# Patient Record
Sex: FEMALE | Race: WHITE | Employment: FULL TIME | URBAN - METROPOLITAN AREA
[De-identification: names, ages, dates, MRNs, and addresses within clinical notes are randomized per-mention and may not be internally consistent; named-entity substitution may affect disease eponyms.]

---

## 2021-06-24 ENCOUNTER — OFFICE VISIT (OUTPATIENT)
Dept: OBGYN CLINIC | Age: 51
End: 2021-06-24
Payer: COMMERCIAL

## 2021-06-24 ENCOUNTER — HOSPITAL ENCOUNTER (OUTPATIENT)
Age: 51
Setting detail: SPECIMEN
Discharge: HOME | End: 2021-06-24
Payer: COMMERCIAL

## 2021-06-24 VITALS
HEIGHT: 71 IN | SYSTOLIC BLOOD PRESSURE: 150 MMHG | WEIGHT: 293 LBS | DIASTOLIC BLOOD PRESSURE: 100 MMHG | BODY MASS INDEX: 41.02 KG/M2

## 2021-06-24 DIAGNOSIS — Z12.31 SCREENING MAMMOGRAM, ENCOUNTER FOR: ICD-10-CM

## 2021-06-24 DIAGNOSIS — Z01.419 WOMEN'S ANNUAL ROUTINE GYNECOLOGICAL EXAMINATION: Primary | ICD-10-CM

## 2021-06-24 PROCEDURE — 99386 PREV VISIT NEW AGE 40-64: CPT | Performed by: OBSTETRICS & GYNECOLOGY

## 2021-06-24 RX ORDER — LOSARTAN POTASSIUM AND HYDROCHLOROTHIAZIDE 12.5; 5 MG/1; MG/1
TABLET ORAL
COMMUNITY
Start: 2021-05-13

## 2021-06-24 NOTE — PROGRESS NOTES
Not on file     Social Determinants of Health     Financial Resource Strain:     Difficulty of Paying Living Expenses:    Food Insecurity:     Worried About Running Out of Food in the Last Year:     [address] in the Last Year:    Transportation Needs:     Lack of Transportation (Medical):  Lack of Transportation (Non-Medical):    Physical Activity:     Days of Exercise per Week:     Minutes of Exercise per Session:    Stress:     Feeling of Stress :    Social Connections:     Frequency of Communication with Friends and Family:     Frequency of Social Gatherings with Friends and Family:     Attends Sikh Services:     Active Member of Clubs or Organizations:     Attends Club or Organization Meetings:     Marital Status:    Intimate Partner Violence:     Fear of Current or Ex-Partner:     Emotionally Abused:     Physically Abused:     Sexually Abused:        MEDICATIONS:  Current Outpatient Medications   Medication Sig Dispense Refill    losartan-hydroCHLOROthiazide (HYZAAR) 50-12.5 MG per tablet        No current facility-administered medications for this visit. ALLERGIES:  Allergies as of 06/24/2021    (No Known Allergies)       Symptoms of decreased mood absent    Immunization status: stated as current, but no records available. Gynecologic History:     Patient's last menstrual period was 06/03/2021. Sexually Active: No    STD History: No     Permanent Sterilization: No   Reversible Birth Control: No        Hormone Replacement Exposure: No      Genetic Qualified Family History of Breast, Ovarian , Colon or Uterine Cancer: No     If YES see scanned worksheet.     Preventative Health Testing:    Health Maintenance:  Health Maintenance Due   Topic Date Due    Potassium monitoring  Never done    Creatinine monitoring  Never done    Hepatitis C screen  Never done    COVID-19 Vaccine (1) Never done    HIV screen  Never done    DTaP/Tdap/Td vaccine (1 - Tdap) Never Constitutional:  Vitals:    06/24/21 0909   BP: (!) 150/100   Site: Left Upper Arm   Position: Sitting   Cuff Size: Large Adult   Weight: (!) 354 lb (160.6 kg)   Height: 5' 11\" (1.803 m)       Chaperone for Intimate Exam   Chaperone was offered and accepted as part of the rooming process.  Chaperone: Willard Madrid          General Appearance: This  is a well Developed, well Nourished, well groomed female. Her BMI was reviewed. Nutritional decision making was discussed. Skin:  There was a Normal Inspection of the skin without rashes or lesions. There were no rashes. Lymphatic:  No Lymph Nodes were Palpable in the neck , axilla or groin.  0 # Of Lymph Nodes     Neck and EENT:  The neck was supple. There were no masses   The thyroid was not enlarged and had no masses. EOMI B/L    Respiratory: The lungs were auscultated and found to be clear. There were no rales, rhonchi or wheezes. There was a good respiratory effort. Cardiovascular: The heart was in a regular rate and rhythm. . No S3 or S4. There was no murmur appreciated. Location, grade, and radiation are not applicable. Extremities: The patients extremities were without calf tenderness, edema, or varicosities. There was full range of motion in all four extremities. Pulses in all four extremities were appreciated and are 2/4. Abdomen: The abdomen was soft and non-tender. There were good bowel sounds in all quadrants and there was no guarding, rebound or rigidity. Abdominal Scars:     Psych: The patient had a normal Orientation to: Time, Place, Person, and Situation  There is no Mood / Affect changes    Breast:  (Chest)  normal appearance, no masses or tenderness  Self breast exams were reviewed in detail. Literature was given. Pelvic Exam:  Vulva and vagina appear normal. Bimanual exam reveals normal uterus and adnexa. Rectal Exam:  exam declined by patient          Musculosk:  Normal Gait and station was noted.   Digits were evaluated without abnormal findings. Range of motion, stability and strength were evaluated and found to be appropriate for the patients age. OMM Structural Component:  The patient did not complain of a Chief complaint requiring OMM. Chief Complaint:none    Structural Exam: No Interest      ASSESSMENT:      46 y.o. Annual   Diagnosis Orders   1. Women's annual routine gynecological examination  PAP Smear   2. Screening mammogram, encounter for            Chief Complaint   Patient presents with    Gynecologic Exam          Past Medical History:   Diagnosis Date    Essential hypertension          There are no problems to display for this patient. Hereditary Breast, Ovarian, Colon and Uterine Cancer screening Done. Tobacco & Secondary smoke risks reviewed; instructed on cessation and avoidance      Counseling Completed:  Preventative Health Recommendations and Follow up. PLAN:  Return in about 1 year (around 6/24/2022), or if symptoms worsen or fail to improve, for annual.  Repeat Annual every 1 year  Cervical Cytology Evaluation begins at 24years old. If Negative Cytology, Follow-up screening per current guidelines. Mammograms every 1 year. If 35 yo and last mammogram was negative. Calcium and Vitamin D dosing reviewed. Colonoscopy screening reviewed as well as onset for bone density testing. STD counseling and prevention reviewed. Routine health maintenance per patients PCP. Orders Placed This Encounter   Procedures    PAP Smear     Patient History:    No LMP recorded. OBGYN Status: unknown  No past surgical history on file. Social History    Tobacco Use      Smoking status: Not on file       Standing Status:   Future     Standing Expiration Date:   6/24/2022     Order Specific Question:   Collection Type     Answer:    Thin Prep     Order Specific Question:   Prior Abnormal Pap Test     Answer:   No     Order Specific Question:   Screening or Diagnostic Answer:   Screening     Order Specific Question:   HPV Requested? Answer:   Yes     Order Specific Question:   High Risk Patient     Answer:   N/A           The patient, Ton Marti is a 46 y.o. female, was seen with a total time spent of 30 minutes for the visit on this date of service by the E/M provider. The time component had both face to face and non face to face time spent in determining the total time component. Counseling and education regarding her diagnosis listed below and her options regarding those diagnoses were also included in determining her time component. Diagnosis Orders   1. Women's annual routine gynecological examination  PAP Smear   2. Screening mammogram, encounter for          The patient had her preventative health maintenance recommendations and follow-up reviewed with her at the completion of her visit.

## 2021-06-29 LAB
HPV SAMPLE: NORMAL
HPV, GENOTYPE 16: NOT DETECTED
HPV, GENOTYPE 18: NOT DETECTED
HPV, HIGH RISK OTHER: NOT DETECTED
HPV, INTERPRETATION: NORMAL
SPECIMEN DESCRIPTION: NORMAL

## 2021-06-30 LAB — CYTOLOGY REPORT: NORMAL

## 2021-07-01 ENCOUNTER — ANESTHESIA (OUTPATIENT)
Dept: OPERATING ROOM | Age: 51
End: 2021-07-01
Payer: COMMERCIAL

## 2021-07-01 ENCOUNTER — HOSPITAL ENCOUNTER (OUTPATIENT)
Age: 51
Setting detail: OBSERVATION
Discharge: HOME | End: 2021-07-02
Attending: EMERGENCY MEDICINE | Admitting: SURGERY
Payer: COMMERCIAL

## 2021-07-01 ENCOUNTER — ANESTHESIA EVENT (OUTPATIENT)
Dept: OPERATING ROOM | Age: 51
End: 2021-07-01
Payer: COMMERCIAL

## 2021-07-01 ENCOUNTER — APPOINTMENT (OUTPATIENT)
Dept: CT IMAGING | Age: 51
End: 2021-07-01
Payer: COMMERCIAL

## 2021-07-01 VITALS
DIASTOLIC BLOOD PRESSURE: 78 MMHG | TEMPERATURE: 99.1 F | SYSTOLIC BLOOD PRESSURE: 156 MMHG | RESPIRATION RATE: 18 BRPM | OXYGEN SATURATION: 92 %

## 2021-07-01 DIAGNOSIS — K35.80 ACUTE APPENDICITIS WITHOUT PERITONITIS: ICD-10-CM

## 2021-07-01 DIAGNOSIS — G89.18 POST-OP PAIN: Primary | ICD-10-CM

## 2021-07-01 LAB
-: ABNORMAL
ABSOLUTE EOS #: 0 K/UL (ref 0–0.4)
ABSOLUTE IMMATURE GRANULOCYTE: ABNORMAL K/UL (ref 0–0.3)
ABSOLUTE LYMPH #: 1 K/UL (ref 1–4.8)
ABSOLUTE MONO #: 0.8 K/UL (ref 0.1–1.2)
ALBUMIN SERPL-MCNC: 4.2 G/DL (ref 3.5–5.2)
ALBUMIN/GLOBULIN RATIO: 1.1 (ref 1–2.5)
ALP BLD-CCNC: 125 U/L (ref 35–104)
ALT SERPL-CCNC: 16 U/L (ref 5–33)
AMORPHOUS: ABNORMAL
ANION GAP SERPL CALCULATED.3IONS-SCNC: 12 MMOL/L (ref 9–17)
AST SERPL-CCNC: 17 U/L
BACTERIA: ABNORMAL
BASOPHILS # BLD: 0 % (ref 0–2)
BASOPHILS ABSOLUTE: 0.1 K/UL (ref 0–0.2)
BILIRUB SERPL-MCNC: 0.96 MG/DL (ref 0.3–1.2)
BILIRUBIN URINE: NEGATIVE
BUN BLDV-MCNC: 12 MG/DL (ref 6–20)
BUN/CREAT BLD: ABNORMAL (ref 9–20)
CALCIUM SERPL-MCNC: 9.5 MG/DL (ref 8.6–10.4)
CASTS UA: ABNORMAL /LPF
CHLORIDE BLD-SCNC: 94 MMOL/L (ref 98–107)
CO2: 23 MMOL/L (ref 20–31)
COLOR: ABNORMAL
COMMENT UA: ABNORMAL
CREAT SERPL-MCNC: 0.85 MG/DL (ref 0.5–0.9)
CRYSTALS, UA: ABNORMAL /HPF
DIFFERENTIAL TYPE: ABNORMAL
EOSINOPHILS RELATIVE PERCENT: 0 % (ref 1–4)
EPITHELIAL CELLS UA: ABNORMAL /HPF (ref 0–5)
GFR AFRICAN AMERICAN: >60 ML/MIN
GFR NON-AFRICAN AMERICAN: >60 ML/MIN
GFR SERPL CREATININE-BSD FRML MDRD: ABNORMAL ML/MIN/{1.73_M2}
GFR SERPL CREATININE-BSD FRML MDRD: ABNORMAL ML/MIN/{1.73_M2}
GLUCOSE BLD-MCNC: 135 MG/DL (ref 70–99)
GLUCOSE URINE: NEGATIVE
HCG QUALITATIVE: NEGATIVE
HCT VFR BLD CALC: 37.4 % (ref 36–46)
HEMOGLOBIN: 12.2 G/DL (ref 12–16)
IMMATURE GRANULOCYTES: ABNORMAL %
KETONES, URINE: ABNORMAL
LEUKOCYTE ESTERASE, URINE: NEGATIVE
LYMPHOCYTES # BLD: 5 % (ref 24–44)
MCH RBC QN AUTO: 27.9 PG (ref 26–34)
MCHC RBC AUTO-ENTMCNC: 32.5 G/DL (ref 31–37)
MCV RBC AUTO: 85.8 FL (ref 80–100)
MONOCYTES # BLD: 4 % (ref 2–11)
MUCUS: ABNORMAL
NITRITE, URINE: NEGATIVE
NRBC AUTOMATED: ABNORMAL PER 100 WBC
OTHER OBSERVATIONS UA: ABNORMAL
PDW BLD-RTO: 14.2 % (ref 12.5–15.4)
PH UA: 5.5 (ref 5–8)
PLATELET # BLD: 380 K/UL (ref 140–450)
PLATELET ESTIMATE: ABNORMAL
PMV BLD AUTO: 7.8 FL (ref 6–12)
POTASSIUM SERPL-SCNC: 3.7 MMOL/L (ref 3.7–5.3)
PROTEIN UA: ABNORMAL
RBC # BLD: 4.37 M/UL (ref 4–5.2)
RBC # BLD: ABNORMAL 10*6/UL
RBC UA: ABNORMAL /HPF (ref 0–2)
RENAL EPITHELIAL, UA: ABNORMAL /HPF
SEG NEUTROPHILS: 91 % (ref 36–66)
SEGMENTED NEUTROPHILS ABSOLUTE COUNT: 17.9 K/UL (ref 1.8–7.7)
SODIUM BLD-SCNC: 129 MMOL/L (ref 135–144)
SPECIFIC GRAVITY UA: 1.01 (ref 1–1.03)
TOTAL PROTEIN: 8 G/DL (ref 6.4–8.3)
TRICHOMONAS: ABNORMAL
TURBIDITY: ABNORMAL
URINE HGB: ABNORMAL
UROBILINOGEN, URINE: NORMAL
WBC # BLD: 19.8 K/UL (ref 3.5–11)
WBC # BLD: ABNORMAL 10*3/UL
WBC UA: ABNORMAL /HPF (ref 0–5)
YEAST: ABNORMAL

## 2021-07-01 PROCEDURE — 3600000009 HC SURGERY ROBOT BASE: Performed by: SURGERY

## 2021-07-01 PROCEDURE — 81001 URINALYSIS AUTO W/SCOPE: CPT

## 2021-07-01 PROCEDURE — 36415 COLL VENOUS BLD VENIPUNCTURE: CPT

## 2021-07-01 PROCEDURE — 3700000001 HC ADD 15 MINUTES (ANESTHESIA): Performed by: SURGERY

## 2021-07-01 PROCEDURE — 85025 COMPLETE CBC W/AUTO DIFF WBC: CPT

## 2021-07-01 PROCEDURE — 6370000000 HC RX 637 (ALT 250 FOR IP): Performed by: STUDENT IN AN ORGANIZED HEALTH CARE EDUCATION/TRAINING PROGRAM

## 2021-07-01 PROCEDURE — 2580000003 HC RX 258: Performed by: EMERGENCY MEDICINE

## 2021-07-01 PROCEDURE — 88304 TISSUE EXAM BY PATHOLOGIST: CPT

## 2021-07-01 PROCEDURE — 99285 EMERGENCY DEPT VISIT HI MDM: CPT

## 2021-07-01 PROCEDURE — 84703 CHORIONIC GONADOTROPIN ASSAY: CPT

## 2021-07-01 PROCEDURE — 2709999900 HC NON-CHARGEABLE SUPPLY: Performed by: SURGERY

## 2021-07-01 PROCEDURE — 6360000002 HC RX W HCPCS: Performed by: EMERGENCY MEDICINE

## 2021-07-01 PROCEDURE — 96372 THER/PROPH/DIAG INJ SC/IM: CPT

## 2021-07-01 PROCEDURE — 2500000003 HC RX 250 WO HCPCS: Performed by: SURGERY

## 2021-07-01 PROCEDURE — 6360000002 HC RX W HCPCS: Performed by: STUDENT IN AN ORGANIZED HEALTH CARE EDUCATION/TRAINING PROGRAM

## 2021-07-01 PROCEDURE — 2580000003 HC RX 258: Performed by: ANESTHESIOLOGY

## 2021-07-01 PROCEDURE — 3700000000 HC ANESTHESIA ATTENDED CARE: Performed by: SURGERY

## 2021-07-01 PROCEDURE — 2580000003 HC RX 258: Performed by: STUDENT IN AN ORGANIZED HEALTH CARE EDUCATION/TRAINING PROGRAM

## 2021-07-01 PROCEDURE — 3600000019 HC SURGERY ROBOT ADDTL 15MIN: Performed by: SURGERY

## 2021-07-01 PROCEDURE — 2500000003 HC RX 250 WO HCPCS: Performed by: ANESTHESIOLOGY

## 2021-07-01 PROCEDURE — 80053 COMPREHEN METABOLIC PANEL: CPT

## 2021-07-01 PROCEDURE — 96365 THER/PROPH/DIAG IV INF INIT: CPT

## 2021-07-01 PROCEDURE — 96366 THER/PROPH/DIAG IV INF ADDON: CPT

## 2021-07-01 PROCEDURE — 6360000004 HC RX CONTRAST MEDICATION: Performed by: EMERGENCY MEDICINE

## 2021-07-01 PROCEDURE — G0378 HOSPITAL OBSERVATION PER HR: HCPCS

## 2021-07-01 PROCEDURE — 74177 CT ABD & PELVIS W/CONTRAST: CPT

## 2021-07-01 PROCEDURE — 7100000001 HC PACU RECOVERY - ADDTL 15 MIN: Performed by: SURGERY

## 2021-07-01 PROCEDURE — S2900 ROBOTIC SURGICAL SYSTEM: HCPCS | Performed by: SURGERY

## 2021-07-01 PROCEDURE — C1760 CLOSURE DEV, VASC: HCPCS | Performed by: SURGERY

## 2021-07-01 PROCEDURE — 2720000010 HC SURG SUPPLY STERILE: Performed by: SURGERY

## 2021-07-01 PROCEDURE — 6360000002 HC RX W HCPCS: Performed by: ANESTHESIOLOGY

## 2021-07-01 PROCEDURE — 7100000000 HC PACU RECOVERY - FIRST 15 MIN: Performed by: SURGERY

## 2021-07-01 PROCEDURE — 96375 TX/PRO/DX INJ NEW DRUG ADDON: CPT

## 2021-07-01 RX ORDER — SODIUM CHLORIDE 0.9 % (FLUSH) 0.9 %
5-40 SYRINGE (ML) INJECTION EVERY 12 HOURS SCHEDULED
Status: DISCONTINUED | OUTPATIENT
Start: 2021-07-01 | End: 2021-07-02 | Stop reason: HOSPADM

## 2021-07-01 RX ORDER — HYDROCHLOROTHIAZIDE 25 MG/1
12.5 TABLET ORAL DAILY
Status: DISCONTINUED | OUTPATIENT
Start: 2021-07-01 | End: 2021-07-02 | Stop reason: HOSPADM

## 2021-07-01 RX ORDER — ONDANSETRON 2 MG/ML
4 INJECTION INTRAMUSCULAR; INTRAVENOUS EVERY 6 HOURS PRN
Status: DISCONTINUED | OUTPATIENT
Start: 2021-07-01 | End: 2021-07-02 | Stop reason: HOSPADM

## 2021-07-01 RX ORDER — PROMETHAZINE HYDROCHLORIDE 25 MG/ML
6.25 INJECTION, SOLUTION INTRAMUSCULAR; INTRAVENOUS
Status: DISCONTINUED | OUTPATIENT
Start: 2021-07-01 | End: 2021-07-01 | Stop reason: HOSPADM

## 2021-07-01 RX ORDER — OXYCODONE HYDROCHLORIDE AND ACETAMINOPHEN 5; 325 MG/1; MG/1
1 TABLET ORAL EVERY 6 HOURS PRN
Qty: 15 TABLET | Refills: 0 | Status: SHIPPED | OUTPATIENT
Start: 2021-07-01 | End: 2021-07-04

## 2021-07-01 RX ORDER — PROPOFOL 10 MG/ML
INJECTION, EMULSION INTRAVENOUS PRN
Status: DISCONTINUED | OUTPATIENT
Start: 2021-07-01 | End: 2021-07-01 | Stop reason: SDUPTHER

## 2021-07-01 RX ORDER — FENTANYL CITRATE 50 UG/ML
INJECTION, SOLUTION INTRAMUSCULAR; INTRAVENOUS PRN
Status: DISCONTINUED | OUTPATIENT
Start: 2021-07-01 | End: 2021-07-01 | Stop reason: SDUPTHER

## 2021-07-01 RX ORDER — MORPHINE SULFATE 2 MG/ML
1 INJECTION, SOLUTION INTRAMUSCULAR; INTRAVENOUS EVERY 5 MIN PRN
Status: DISCONTINUED | OUTPATIENT
Start: 2021-07-01 | End: 2021-07-01 | Stop reason: HOSPADM

## 2021-07-01 RX ORDER — ONDANSETRON 2 MG/ML
INJECTION INTRAMUSCULAR; INTRAVENOUS PRN
Status: DISCONTINUED | OUTPATIENT
Start: 2021-07-01 | End: 2021-07-01 | Stop reason: SDUPTHER

## 2021-07-01 RX ORDER — FENTANYL CITRATE 50 UG/ML
25 INJECTION, SOLUTION INTRAMUSCULAR; INTRAVENOUS EVERY 5 MIN PRN
Status: DISCONTINUED | OUTPATIENT
Start: 2021-07-01 | End: 2021-07-01 | Stop reason: HOSPADM

## 2021-07-01 RX ORDER — CYCLOBENZAPRINE HCL 10 MG
10 TABLET ORAL 2 TIMES DAILY
Status: DISCONTINUED | OUTPATIENT
Start: 2021-07-01 | End: 2021-07-02 | Stop reason: HOSPADM

## 2021-07-01 RX ORDER — ONDANSETRON 2 MG/ML
4 INJECTION INTRAMUSCULAR; INTRAVENOUS
Status: DISCONTINUED | OUTPATIENT
Start: 2021-07-01 | End: 2021-07-01 | Stop reason: HOSPADM

## 2021-07-01 RX ORDER — MIDAZOLAM HYDROCHLORIDE 1 MG/ML
INJECTION INTRAMUSCULAR; INTRAVENOUS PRN
Status: DISCONTINUED | OUTPATIENT
Start: 2021-07-01 | End: 2021-07-01 | Stop reason: SDUPTHER

## 2021-07-01 RX ORDER — MEPERIDINE HYDROCHLORIDE 50 MG/ML
12.5 INJECTION INTRAMUSCULAR; INTRAVENOUS; SUBCUTANEOUS EVERY 5 MIN PRN
Status: DISCONTINUED | OUTPATIENT
Start: 2021-07-01 | End: 2021-07-01 | Stop reason: HOSPADM

## 2021-07-01 RX ORDER — BUPIVACAINE HYDROCHLORIDE 2.5 MG/ML
INJECTION, SOLUTION INFILTRATION; PERINEURAL PRN
Status: DISCONTINUED | OUTPATIENT
Start: 2021-07-01 | End: 2021-07-01 | Stop reason: HOSPADM

## 2021-07-01 RX ORDER — 0.9 % SODIUM CHLORIDE 0.9 %
1000 INTRAVENOUS SOLUTION INTRAVENOUS ONCE
Status: COMPLETED | OUTPATIENT
Start: 2021-07-01 | End: 2021-07-01

## 2021-07-01 RX ORDER — KETOROLAC TROMETHAMINE 30 MG/ML
INJECTION, SOLUTION INTRAMUSCULAR; INTRAVENOUS PRN
Status: DISCONTINUED | OUTPATIENT
Start: 2021-07-01 | End: 2021-07-01 | Stop reason: SDUPTHER

## 2021-07-01 RX ORDER — SODIUM CHLORIDE 9 MG/ML
25 INJECTION, SOLUTION INTRAVENOUS PRN
Status: DISCONTINUED | OUTPATIENT
Start: 2021-07-01 | End: 2021-07-02 | Stop reason: HOSPADM

## 2021-07-01 RX ORDER — LOSARTAN POTASSIUM AND HYDROCHLOROTHIAZIDE 12.5; 5 MG/1; MG/1
1 TABLET ORAL DAILY
Status: DISCONTINUED | OUTPATIENT
Start: 2021-07-01 | End: 2021-07-01 | Stop reason: RX

## 2021-07-01 RX ORDER — BUPIVACAINE HYDROCHLORIDE 2.5 MG/ML
INJECTION, SOLUTION EPIDURAL; INFILTRATION; INTRACAUDAL
Status: DISCONTINUED
Start: 2021-07-01 | End: 2021-07-01

## 2021-07-01 RX ORDER — MORPHINE SULFATE 4 MG/ML
4 INJECTION, SOLUTION INTRAMUSCULAR; INTRAVENOUS
Status: DISCONTINUED | OUTPATIENT
Start: 2021-07-01 | End: 2021-07-02 | Stop reason: HOSPADM

## 2021-07-01 RX ORDER — DOCUSATE SODIUM 100 MG/1
100 CAPSULE, LIQUID FILLED ORAL 2 TIMES DAILY
Qty: 20 CAPSULE | Refills: 0 | Status: SHIPPED | OUTPATIENT
Start: 2021-07-01 | End: 2021-10-01

## 2021-07-01 RX ORDER — SODIUM CHLORIDE 0.9 % (FLUSH) 0.9 %
5-40 SYRINGE (ML) INJECTION PRN
Status: DISCONTINUED | OUTPATIENT
Start: 2021-07-01 | End: 2021-07-02 | Stop reason: HOSPADM

## 2021-07-01 RX ORDER — CYCLOBENZAPRINE HCL 10 MG
10 TABLET ORAL 2 TIMES DAILY PRN
Qty: 20 TABLET | Refills: 0 | Status: SHIPPED | OUTPATIENT
Start: 2021-07-01 | End: 2021-07-11

## 2021-07-01 RX ORDER — DEXAMETHASONE SODIUM PHOSPHATE 10 MG/ML
INJECTION, SOLUTION INTRAMUSCULAR; INTRAVENOUS PRN
Status: DISCONTINUED | OUTPATIENT
Start: 2021-07-01 | End: 2021-07-01 | Stop reason: SDUPTHER

## 2021-07-01 RX ORDER — BUPIVACAINE HYDROCHLORIDE AND EPINEPHRINE 5; 5 MG/ML; UG/ML
INJECTION, SOLUTION PERINEURAL
Status: DISCONTINUED
Start: 2021-07-01 | End: 2021-07-01

## 2021-07-01 RX ORDER — HYDROCODONE BITARTRATE AND ACETAMINOPHEN 5; 325 MG/1; MG/1
2 TABLET ORAL PRN
Status: DISCONTINUED | OUTPATIENT
Start: 2021-07-01 | End: 2021-07-01 | Stop reason: HOSPADM

## 2021-07-01 RX ORDER — LOSARTAN POTASSIUM 50 MG/1
50 TABLET ORAL DAILY
Status: DISCONTINUED | OUTPATIENT
Start: 2021-07-01 | End: 2021-07-02 | Stop reason: HOSPADM

## 2021-07-01 RX ORDER — SODIUM CHLORIDE, SODIUM LACTATE, POTASSIUM CHLORIDE, CALCIUM CHLORIDE 600; 310; 30; 20 MG/100ML; MG/100ML; MG/100ML; MG/100ML
INJECTION, SOLUTION INTRAVENOUS CONTINUOUS PRN
Status: DISCONTINUED | OUTPATIENT
Start: 2021-07-01 | End: 2021-07-01 | Stop reason: SDUPTHER

## 2021-07-01 RX ORDER — 0.9 % SODIUM CHLORIDE 0.9 %
80 INTRAVENOUS SOLUTION INTRAVENOUS ONCE
Status: COMPLETED | OUTPATIENT
Start: 2021-07-01 | End: 2021-07-01

## 2021-07-01 RX ORDER — LIDOCAINE HYDROCHLORIDE 10 MG/ML
INJECTION, SOLUTION EPIDURAL; INFILTRATION; INTRACAUDAL; PERINEURAL PRN
Status: DISCONTINUED | OUTPATIENT
Start: 2021-07-01 | End: 2021-07-01 | Stop reason: SDUPTHER

## 2021-07-01 RX ORDER — AMOXICILLIN AND CLAVULANATE POTASSIUM 875; 125 MG/1; MG/1
1 TABLET, FILM COATED ORAL 2 TIMES DAILY
Qty: 14 TABLET | Refills: 0 | Status: SHIPPED | OUTPATIENT
Start: 2021-07-01 | End: 2021-07-08

## 2021-07-01 RX ORDER — ONDANSETRON 4 MG/1
4 TABLET, ORALLY DISINTEGRATING ORAL EVERY 8 HOURS PRN
Status: DISCONTINUED | OUTPATIENT
Start: 2021-07-01 | End: 2021-07-02 | Stop reason: HOSPADM

## 2021-07-01 RX ORDER — OXYCODONE HYDROCHLORIDE AND ACETAMINOPHEN 5; 325 MG/1; MG/1
1 TABLET ORAL EVERY 6 HOURS PRN
Status: DISCONTINUED | OUTPATIENT
Start: 2021-07-01 | End: 2021-07-02 | Stop reason: HOSPADM

## 2021-07-01 RX ORDER — MORPHINE SULFATE 4 MG/ML
4 INJECTION, SOLUTION INTRAMUSCULAR; INTRAVENOUS ONCE
Status: COMPLETED | OUTPATIENT
Start: 2021-07-01 | End: 2021-07-01

## 2021-07-01 RX ORDER — SUCCINYLCHOLINE/SOD CL,ISO/PF 100 MG/5ML
SYRINGE (ML) INTRAVENOUS PRN
Status: DISCONTINUED | OUTPATIENT
Start: 2021-07-01 | End: 2021-07-01 | Stop reason: SDUPTHER

## 2021-07-01 RX ORDER — HYDRALAZINE HYDROCHLORIDE 20 MG/ML
5 INJECTION INTRAMUSCULAR; INTRAVENOUS EVERY 10 MIN PRN
Status: DISCONTINUED | OUTPATIENT
Start: 2021-07-01 | End: 2021-07-01 | Stop reason: HOSPADM

## 2021-07-01 RX ORDER — ROCURONIUM BROMIDE 10 MG/ML
INJECTION, SOLUTION INTRAVENOUS PRN
Status: DISCONTINUED | OUTPATIENT
Start: 2021-07-01 | End: 2021-07-01 | Stop reason: SDUPTHER

## 2021-07-01 RX ORDER — SODIUM CHLORIDE 0.9 % (FLUSH) 0.9 %
10 SYRINGE (ML) INJECTION PRN
Status: DISCONTINUED | OUTPATIENT
Start: 2021-07-01 | End: 2021-07-02 | Stop reason: HOSPADM

## 2021-07-01 RX ORDER — BUPIVACAINE HYDROCHLORIDE AND EPINEPHRINE 5; 5 MG/ML; UG/ML
INJECTION, SOLUTION EPIDURAL; INTRACAUDAL; PERINEURAL PRN
Status: DISCONTINUED | OUTPATIENT
Start: 2021-07-01 | End: 2021-07-01 | Stop reason: HOSPADM

## 2021-07-01 RX ORDER — MORPHINE SULFATE 2 MG/ML
2 INJECTION, SOLUTION INTRAMUSCULAR; INTRAVENOUS
Status: DISCONTINUED | OUTPATIENT
Start: 2021-07-01 | End: 2021-07-02 | Stop reason: HOSPADM

## 2021-07-01 RX ORDER — HYDROCODONE BITARTRATE AND ACETAMINOPHEN 5; 325 MG/1; MG/1
1 TABLET ORAL PRN
Status: DISCONTINUED | OUTPATIENT
Start: 2021-07-01 | End: 2021-07-01 | Stop reason: HOSPADM

## 2021-07-01 RX ORDER — DIPHENHYDRAMINE HYDROCHLORIDE 50 MG/ML
12.5 INJECTION INTRAMUSCULAR; INTRAVENOUS
Status: DISCONTINUED | OUTPATIENT
Start: 2021-07-01 | End: 2021-07-01 | Stop reason: HOSPADM

## 2021-07-01 RX ADMIN — IOPAMIDOL 75 ML: 755 INJECTION, SOLUTION INTRAVENOUS at 10:41

## 2021-07-01 RX ADMIN — ROCURONIUM BROMIDE 40 MG: 10 INJECTION INTRAVENOUS at 13:25

## 2021-07-01 RX ADMIN — MORPHINE SULFATE 4 MG: 4 INJECTION INTRAVENOUS at 10:15

## 2021-07-01 RX ADMIN — PROPOFOL INJECTABLE EMULSION 30 MG: 10 INJECTION, EMULSION INTRAVENOUS at 14:43

## 2021-07-01 RX ADMIN — DEXAMETHASONE SODIUM PHOSPHATE 10 MG: 10 INJECTION, SOLUTION INTRAMUSCULAR; INTRAVENOUS at 13:35

## 2021-07-01 RX ADMIN — PROPOFOL INJECTABLE EMULSION 20 MG: 10 INJECTION, EMULSION INTRAVENOUS at 13:54

## 2021-07-01 RX ADMIN — PROPOFOL INJECTABLE EMULSION 30 MG: 10 INJECTION, EMULSION INTRAVENOUS at 14:58

## 2021-07-01 RX ADMIN — FENTANYL CITRATE 50 MCG: 50 INJECTION, SOLUTION INTRAMUSCULAR; INTRAVENOUS at 13:24

## 2021-07-01 RX ADMIN — SUGAMMADEX 150 MG: 100 INJECTION, SOLUTION INTRAVENOUS at 14:55

## 2021-07-01 RX ADMIN — PROPOFOL INJECTABLE EMULSION 200 MG: 10 INJECTION, EMULSION INTRAVENOUS at 13:20

## 2021-07-01 RX ADMIN — SODIUM CHLORIDE 1000 ML: 9 INJECTION, SOLUTION INTRAVENOUS at 10:15

## 2021-07-01 RX ADMIN — SODIUM CHLORIDE, PRESERVATIVE FREE 10 ML: 5 INJECTION INTRAVENOUS at 10:41

## 2021-07-01 RX ADMIN — FENTANYL CITRATE 50 MCG: 50 INJECTION, SOLUTION INTRAMUSCULAR; INTRAVENOUS at 13:50

## 2021-07-01 RX ADMIN — LOSARTAN POTASSIUM 50 MG: 50 TABLET, FILM COATED ORAL at 17:26

## 2021-07-01 RX ADMIN — PROPOFOL INJECTABLE EMULSION 20 MG: 10 INJECTION, EMULSION INTRAVENOUS at 14:49

## 2021-07-01 RX ADMIN — FENTANYL CITRATE 50 MCG: 50 INJECTION, SOLUTION INTRAMUSCULAR; INTRAVENOUS at 13:20

## 2021-07-01 RX ADMIN — PIPERACILLIN AND TAZOBACTAM 3375 MG: 3; .375 INJECTION, POWDER, LYOPHILIZED, FOR SOLUTION INTRAVENOUS at 22:50

## 2021-07-01 RX ADMIN — Medication 120 MG: at 13:20

## 2021-07-01 RX ADMIN — SODIUM CHLORIDE, PRESERVATIVE FREE 10 ML: 5 INJECTION INTRAVENOUS at 17:27

## 2021-07-01 RX ADMIN — MIDAZOLAM HYDROCHLORIDE 2 MG: 1 INJECTION, SOLUTION INTRAMUSCULAR; INTRAVENOUS at 13:17

## 2021-07-01 RX ADMIN — SODIUM CHLORIDE, POTASSIUM CHLORIDE, SODIUM LACTATE AND CALCIUM CHLORIDE: 600; 310; 30; 20 INJECTION, SOLUTION INTRAVENOUS at 14:00

## 2021-07-01 RX ADMIN — PROPOFOL INJECTABLE EMULSION 10 MG: 10 INJECTION, EMULSION INTRAVENOUS at 14:55

## 2021-07-01 RX ADMIN — SODIUM CHLORIDE, POTASSIUM CHLORIDE, SODIUM LACTATE AND CALCIUM CHLORIDE: 600; 310; 30; 20 INJECTION, SOLUTION INTRAVENOUS at 14:45

## 2021-07-01 RX ADMIN — KETOROLAC TROMETHAMINE 30 MG: 30 INJECTION, SOLUTION INTRAMUSCULAR; INTRAVENOUS at 14:47

## 2021-07-01 RX ADMIN — ROCURONIUM BROMIDE 10 MG: 10 INJECTION INTRAVENOUS at 13:20

## 2021-07-01 RX ADMIN — HYDROCHLOROTHIAZIDE 12.5 MG: 25 TABLET ORAL at 17:26

## 2021-07-01 RX ADMIN — SODIUM CHLORIDE 80 ML: 9 INJECTION, SOLUTION INTRAVENOUS at 10:40

## 2021-07-01 RX ADMIN — SODIUM CHLORIDE, PRESERVATIVE FREE 10 ML: 5 INJECTION INTRAVENOUS at 20:50

## 2021-07-01 RX ADMIN — ONDANSETRON 4 MG: 2 INJECTION, SOLUTION INTRAMUSCULAR; INTRAVENOUS at 13:15

## 2021-07-01 RX ADMIN — ENOXAPARIN SODIUM 40 MG: 40 INJECTION SUBCUTANEOUS at 17:26

## 2021-07-01 RX ADMIN — SODIUM CHLORIDE, POTASSIUM CHLORIDE, SODIUM LACTATE AND CALCIUM CHLORIDE: 600; 310; 30; 20 INJECTION, SOLUTION INTRAVENOUS at 13:15

## 2021-07-01 RX ADMIN — FENTANYL CITRATE 50 MCG: 50 INJECTION, SOLUTION INTRAMUSCULAR; INTRAVENOUS at 13:17

## 2021-07-01 RX ADMIN — CYCLOBENZAPRINE 10 MG: 10 TABLET, FILM COATED ORAL at 20:50

## 2021-07-01 RX ADMIN — PIPERACILLIN AND TAZOBACTAM 3375 MG: 3; .375 INJECTION, POWDER, LYOPHILIZED, FOR SOLUTION INTRAVENOUS at 11:55

## 2021-07-01 RX ADMIN — LIDOCAINE HYDROCHLORIDE 100 MG: 10 INJECTION, SOLUTION EPIDURAL; INFILTRATION; INTRACAUDAL; PERINEURAL at 14:42

## 2021-07-01 RX ADMIN — SUGAMMADEX 350 MG: 100 INJECTION, SOLUTION INTRAVENOUS at 14:46

## 2021-07-01 RX ADMIN — PIPERACILLIN AND TAZOBACTAM 3375 MG: 3; .375 INJECTION, POWDER, LYOPHILIZED, FOR SOLUTION INTRAVENOUS at 17:27

## 2021-07-01 ASSESSMENT — PULMONARY FUNCTION TESTS
PIF_VALUE: 27
PIF_VALUE: 38
PIF_VALUE: 27
PIF_VALUE: 2
PIF_VALUE: 1
PIF_VALUE: 38
PIF_VALUE: 25
PIF_VALUE: 23
PIF_VALUE: 33
PIF_VALUE: 38
PIF_VALUE: 26
PIF_VALUE: 38
PIF_VALUE: 26
PIF_VALUE: 38
PIF_VALUE: 23
PIF_VALUE: 26
PIF_VALUE: 26
PIF_VALUE: 1
PIF_VALUE: 39
PIF_VALUE: 34
PIF_VALUE: 4
PIF_VALUE: 18
PIF_VALUE: 38
PIF_VALUE: 27
PIF_VALUE: 38
PIF_VALUE: 33
PIF_VALUE: 38
PIF_VALUE: 38
PIF_VALUE: 22
PIF_VALUE: 33
PIF_VALUE: 41
PIF_VALUE: 20
PIF_VALUE: 38
PIF_VALUE: 22
PIF_VALUE: 38
PIF_VALUE: 23
PIF_VALUE: 38
PIF_VALUE: 33
PIF_VALUE: 30
PIF_VALUE: 1
PIF_VALUE: 38
PIF_VALUE: 1
PIF_VALUE: 38
PIF_VALUE: 24
PIF_VALUE: 33
PIF_VALUE: 24
PIF_VALUE: 23
PIF_VALUE: 38
PIF_VALUE: 7
PIF_VALUE: 38
PIF_VALUE: 27
PIF_VALUE: 25
PIF_VALUE: 38
PIF_VALUE: 29
PIF_VALUE: 26
PIF_VALUE: 32
PIF_VALUE: 38
PIF_VALUE: 33
PIF_VALUE: 33
PIF_VALUE: 25
PIF_VALUE: 39
PIF_VALUE: 26
PIF_VALUE: 3
PIF_VALUE: 35
PIF_VALUE: 1
PIF_VALUE: 38
PIF_VALUE: 33
PIF_VALUE: 38
PIF_VALUE: 38
PIF_VALUE: 25
PIF_VALUE: 38
PIF_VALUE: 26
PIF_VALUE: 26
PIF_VALUE: 38
PIF_VALUE: 26
PIF_VALUE: 2
PIF_VALUE: 24
PIF_VALUE: 20
PIF_VALUE: 38
PIF_VALUE: 21
PIF_VALUE: 26
PIF_VALUE: 27
PIF_VALUE: 4
PIF_VALUE: 38
PIF_VALUE: 25
PIF_VALUE: 33
PIF_VALUE: 20
PIF_VALUE: 29
PIF_VALUE: 38
PIF_VALUE: 15
PIF_VALUE: 33

## 2021-07-01 ASSESSMENT — PAIN SCALES - GENERAL
PAINLEVEL_OUTOF10: 0
PAINLEVEL_OUTOF10: 1
PAINLEVEL_OUTOF10: 0
PAINLEVEL_OUTOF10: 0
PAINLEVEL_OUTOF10: 1

## 2021-07-01 NOTE — ANESTHESIA POSTPROCEDURE EVALUATION
POST- ANESTHESIA EVALUATION       Pt Name: Robson Mckenna  MRN: [id number]  [place]: 1970  Date of evaluation: 7/1/2021  Time:  3:55 PM      /76   Pulse 95   Temp 97.5 °F (36.4 °C) (Temporal)   Resp 20   Ht 5' 11\" (1.803 m)   Wt (!) 350 lb (158.8 kg)   LMP 06/03/2021   SpO2 94%   BMI 48.82 kg/m²      Consciousness Level  Awake  Cardiopulmonary Status  Stable  Pain Adequately Treated YES  Nausea / Vomiting  NO  Adequate Hydration  YES  Anesthesia Related Complications NONE      Electronically signed by Dakota Baca MD on 7/1/2021 at 3:55 PM       Department of Anesthesiology  Postprocedure Note    Patient: Robson Mckenna  MRN: [id number]  [place]: 1970  Date of evaluation: 7/1/2021  Time:  3:55 PM     Procedure Summary     Date: 07/01/21 Room / Location: [address] [place] [address] / [address] [place] [address]    Anesthesia Start: 6859 Anesthesia Stop: 1517    Procedure: APPENDECTOMY LAPAROSCOPIC ROBOTIC (N/A ) Diagnosis: (ACUTE APPENDICITIS)    Surgeons: Janet Peñaloza IV, DO Responsible Provider: Dakota Baca MD    Anesthesia Type: general ASA Status: 3 - Emergent          Anesthesia Type: general    James Phase I: James Score: 7    James Phase II:      Last vitals: Reviewed and per EMR flowsheets.        Anesthesia Post Evaluation

## 2021-07-01 NOTE — H&P
Chief Complaint   Patient presents with    Abdominal Pain     right sided abd pain x2 days. reports n/v over past couple days. HxCC:  45 y/o female presents to emergency department for evaluation of abdominal pain. Patient states crampy periumbilical pain started on Tuesday. Denies nausea or vomiting. Moving bowels. Denies fevers, chills, or sweats. Pain became sharp right lower quadrant pain yesterday. Vitals:    07/01/21 1104   BP: (!) 158/91   Pulse: 104   Resp: 16   Temp:    SpO2: 98%       There is no problem list on file for this patient. Current Facility-Administered Medications   Medication Dose Route Frequency Provider Last Rate Last Admin    sodium chloride flush 0.9 % injection 10 mL  10 mL Intravenous PRN Mary Ellen Oneil MD   10 mL at 07/01/21 1041    piperacillin-tazobactam (ZOSYN) 3,375 mg in dextrose 5 % 50 mL IVPB (mini-bag)  3,375 mg Intravenous Once Mary Ellen Oneil MD         Current Outpatient Medications   Medication Sig Dispense Refill    losartan-hydroCHLOROthiazide (HYZAAR) 50-12.5 MG per tablet          No Known Allergies    Past Medical History:   Diagnosis Date    Essential hypertension        Past Surgical History:   Procedure Laterality Date    TONSILLECTOMY         History reviewed. No pertinent family history.     Social History     Socioeconomic History    Marital status: Single     Spouse name: Not on file    Number of children: Not on file    Years of education: Not on file    Highest education level: Not on file   Occupational History    Not on file   Tobacco Use    Smoking status: Never Smoker    Smokeless tobacco: Never Used   Vaping Use    Vaping Use: Never used   Substance and Sexual Activity    Alcohol use: Yes     Comment: socially    Drug use: Never    Sexual activity: Not on file   Other Topics Concern    Not on file   Social History Narrative    Not on file     Social Determinants of Health     Financial Resource Strain:     - 12.0 fL Final 07/01/2021 10:12 AM [facility]   NRBC Automated NOT REPORTED  per 100 WBC Final 07/01/2021 10:12 AM [facility]   Differential Type NOT REPORTED   Final 07/01/2021 10:12 AM [facility]   Seg Neutrophils 91High   36 - 66 % Final 07/01/2021 10:12 AM [facility]   Lymphocytes 5Low   24 - 44 % Final 07/01/2021 10:12 AM [facility]   Monocytes 4  2 - 11 % Final 07/01/2021 10:12 AM [facility]   Eosinophils % 0Low   1 - 4 % Final 07/01/2021 10:12 AM [facility]   Basophils 0  0 - 2 % Final 07/01/2021 10:12 AM [facility]   Immature Granulocytes NOT REPORTED  0 % Final 07/01/2021 10:12 AM [facility]   Segs Absolute 17. 90High   1.8 - 7.7 k/uL Final 07/01/2021 10:12 AM [facility]   Absolute Lymph # 1.00  1.0 - 4.8 k/uL Final 07/01/2021 10:12 AM [facility]   Absolute Mono # 0.80  0.1 - 1.2 k/uL Final 07/01/2021 10:12 AM [facility]   Absolute Eos # 0.00  0.0 - 0.4 k/uL Final 07/01/2021 10:12 AM [facility]   Basophils Absolute 0.10  0.0 - 0.2 k/uL Final 07/01/2021 10:12 AM [facility]   Absolute Immature Granulocyte NOT REPORTED  0.00 - 0.30 k/uL Final 07/01/2021 10:12 AM [facility]   WBC Morphology NOT REPORTED   Final 07/01/2021 10:12 AM [facility]   RBC Morphology NOT REPORTED   Final 07/01/2021 10:12 AM [facility]   Platelet Estimate NOT REPORTED   Final 07/01/2021 10:12 AM [facility]     EXAMINATION:   CT OF THE ABDOMEN AND PELVIS WITH CONTRAST 7/1/2021 10:13 am       TECHNIQUE:   CT of the abdomen and pelvis was performed with the administration of   intravenous contrast. Multiplanar reformatted images are provided for review.    Dose modulation, iterative reconstruction, and/or weight based adjustment of   the mA/kV was utilized to reduce the radiation dose to as low as reasonably   achievable.       COMPARISON:   None.       HISTORY:   ORDERING SYSTEM PROVIDED HISTORY: rlq pain   TECHNOLOGIST PROVIDED HISTORY:   rlq pain       Decision Support Exception - unselect if not a suspected or confirmed   emergency medical condition->Emergency Medical Condition (MA)   Reason for Exam: RLQ pain   Acuity: Unknown   Type of Exam: Unknown       FINDINGS:   Lower Chest: The visualized heart and lungs show no acute abnormalities.       Organs: Scattered hepatic cysts largest 4.5 cm in the left lobe.  Spleen,   pancreas, adrenal glands show no significant abnormalities.       1.8 cm left renal cyst in the lower pole.  No hydronephrosis.       GI/Bowel: There is limited evaluation due to absence of oral contrast.   Stomach collapsed otherwise unremarkable.  There is thickened appendix with   an appendicolith at the base.  There is a large amount of periappendiceal   inflammatory stranding and some free fluid most compatible with acute   appendicitis. Noreene Shouts are few fluid-filled mildly dilated mid abdominal small   bowel loops which could be some developing ileus.  Evaluation of the colon   shows no acute process.       Pelvis: There is 11 x 17 x 16 cm lobulated solid slightly heterogeneous soft   tissue density mass in the pelvis rising almost at level of umbilicus.  This   abuts the uterine fundus.  This is probably a pedunculated leiomyoma.       Peritoneum/Retroperitoneum: Right lower quadrant inflammatory changes. Shortly non pathologically enlarged retroperitoneal lymph nodes.       Bones/Soft Tissues: No acute abnormality of the bones.  The superficial soft   tissues show no significant abnormalities.           Impression   1. Findings consistent with acute appendicitis.  No evidence for abscess   formation or perforation. 2. Lower abdomen/upper pelvic 17 cm solid soft tissue mass abutting the   uterine fundus likely a pedunculated leiomyoma.  Given size, assessment by   ultrasound may be challenging.  Non emergency MR imaging may be considered. Alternatively this may also be inspected during anticipated appendectomy.          Impression:  Acute appendicitis    Plan:  Patient with history and physical exam consistent with acute appendicitis. Patient will be taken to surgery emergently for appendectomy. Patient informed of the risks of surgery. Patient understood risks and signed informed consent for laparoscopic robotic assisted appendectomy under general anesthesia. If uncomplicated appendicitis with no perforation or abscess, will plan to discharge to home from PACU.     Electronically signed by Lucinda Peñaloza IV, DO on 7/1/21 at 11:58 AM EDT

## 2021-07-01 NOTE — ANESTHESIA PRE PROCEDURE
Department of Anesthesiology  Preprocedure Note       Name:  Zoila Cope   Age:  46 y.o.  :  1970                                          MRN:  [id number]         Date:  2021      Surgeon: Sofie Goldstein):  Dean Peñaloza IV, DO    Procedure: Procedure(s):  APPENDECTOMY LAPAROSCOPIC ROBOTIC    Medications prior to admission:   Prior to Admission medications    Medication Sig Start Date End Date Taking? Authorizing Provider   losartan-hydroCHLOROthiazide [facility]) 50-12.5 MG per tablet  21  Yes Historical Provider, MD       Current medications:    Current Facility-Administered Medications   Medication Dose Route Frequency Provider Last Rate Last Admin    sodium chloride flush 0.9 % injection 10 mL  10 mL Intravenous PRN Violette Gr MD   10 mL at 21 1041     Current Outpatient Medications   Medication Sig Dispense Refill    losartan-hydroCHLOROthiazide (HYZAAR) 50-12.5 MG per tablet          Allergies:  No Known Allergies    Problem List:  There is no problem list on file for this patient.       Past Medical History:        Diagnosis Date    Essential hypertension        Past Surgical History:        Procedure Laterality Date    TONSILLECTOMY         Social History:    Social History     Tobacco Use    Smoking status: Never Smoker    Smokeless tobacco: Never Used   Substance Use Topics    Alcohol use: Yes     Comment: socially                                Counseling given: Not Answered      Vital Signs (Current):   Vitals:    21 1008 21 1034 21 1104   BP: (!) 154/95  (!) 158/91   Pulse: 125  104   Resp: 16  16   Temp:  98.6 °F (37 °C)    TempSrc:  Oral    SpO2: 97%  98%   Weight: (!) 350 lb (158.8 kg)     Height: 5' 11\" (1.803 m)                                                BP Readings from Last 3 Encounters:   21 (!) 158/91   21 (!) 150/100       NPO Status:                                                                                 BMI:   Wt Readings from Last 3 Encounters:   07/01/21 (!) 350 lb (158.8 kg)   06/24/21 (!) 354 lb (160.6 kg)     Body mass index is 48.82 kg/m². CBC:   Lab Results   Component Value Date    WBC 19.8 07/01/2021    RBC 4.37 07/01/2021    HGB 12.2 07/01/2021    HCT 37.4 07/01/2021    MCV 85.8 07/01/2021    RDW 14.2 07/01/2021     07/01/2021       CMP:   Lab Results   Component Value Date     07/01/2021    K 3.7 07/01/2021    CL 94 07/01/2021    CO2 23 07/01/2021    BUN 12 07/01/2021    CREATININE 0.85 07/01/2021    GFRAA >60 07/01/2021    LABGLOM >60 07/01/2021    GLUCOSE 135 07/01/2021    PROT 8.0 07/01/2021    CALCIUM 9.5 07/01/2021    BILITOT 0.96 07/01/2021    ALKPHOS 125 07/01/2021    AST 17 07/01/2021    ALT 16 07/01/2021       POC Tests: No results for input(s): POCGLU, POCNA, POCK, POCCL, POCBUN, POCHEMO, POCHCT in the last 72 hours.     Coags: No results found for: PROTIME, INR, APTT    HCG (If Applicable): No results found for: PREGTESTUR, PREGSERUM, HCG, HCGQUANT     ABGs: No results found for: PHART, PO2ART, SMC2IJB, YNW2ZER, BEART, B0WVCWVC     Type & Screen (If Applicable):  No results found for: LABABO, LABRH    Drug/Infectious Status (If Applicable):  No results found for: HIV, HEPCAB    COVID-19 Screening (If Applicable): No results found for: COVID19        Anesthesia Evaluation  Patient summary reviewed and Nursing notes reviewed no history of anesthetic complications:   Airway: Mallampati: II  TM distance: >3 FB   Neck ROM: full  Mouth opening: > = 3 FB Dental: normal exam         Pulmonary:Negative Pulmonary ROS and normal exam  breath sounds clear to auscultation                             Cardiovascular:    (+) hypertension: no interval change,         Rhythm: regular  Rate: normal                    Neuro/Psych:   Negative Neuro/Psych ROS              GI/Hepatic/Renal:   (+) morbid obesity          Endo/Other: Negative Endo/Other ROS                    Abdominal:   (+) obese,     Abdomen: soft and tender. Vascular: negative vascular ROS. Other Findings:             Anesthesia Plan      general     ASA 3 - emergent       Induction: intravenous. MIPS: Postoperative opioids intended and Prophylactic antiemetics administered. Anesthetic plan and risks discussed with patient. Plan discussed with CRNA.                   Lamar Torres MD   7/1/2021

## 2021-07-01 NOTE — OP NOTE
pneumoperitoneum. After the abdomen was adequately insufflated we used the Optiview technique to place a 12 mm port into the left upper quadrant. We then examined the left upper quadrant, there were no damages or injuries found to the underlying bowel and mesentery. We then placed two 8 mm ports along the left lateral abdominal wall under direct camera visualization. The [employer] robot was then docked. We then placed the prograsp in the left arm and the vessel sealer in the right arm. We then examined the right lower quadrant. Upon visualization of the right lower quadrant we found a large amount of purulent drainage. Upon further examination we found a perforated appendix with feculent peritonitis. We then mobilized the small bowel to the upper left quadrant to further visualize the cecum and appendix. We then removed the vessel sealer and placed the [place] irrigation device. We then irrigated the right lower quadrant. We then performed sharp dissection to further mobilize the appendix. After completely mobilizing the appendix were able to visualize the base of the appendix going into the cecum. We then placed the vessel sealer in the right lower quadrant and took down the mesoappendix. We then created a window and were able to completely visualize the base of the appendix. We then placed the stapler into the abdomen. Were able to place the stapler across the base of the appendix taking a small portion of the cecum. After the stapler was fired the staple line was found to be intact with no bleeding or drainage. The appendix was placed in the retrieval bag. We then placed the suction irrigation device once again. We were able to washout the abdomen with removal of clear fluid at the end of the irrigation. We then removed the appendix in the removal bag in the left upper quadrant. The right lower quadrant was once again inspected with findings of a normal-appearing staple line.   All the fluid had been removed. We then decided to place a 19 [place] round drain in the right lower quadrant. This was tunneled through to the right lower quadrant and sewn in place using 3-0 nylon. The [employer] robot was then undocked and moved away from the patient. We then placed a AMANDA block in each upper quadrant for a total of 50 mL of Marcaine. The left upper quadrant 12 mm port was then closed using a [employer] needle with a 2-0 Vicryl suture. The abdomen was then desufflated. All ports were removed. The abdomen was then washed and irrigated. The port site incisions were closed using 4-0 Monocryl. Dermabond skin glue was then applied. This concluded the surgical procedure. At the end of the case all sponge, needle, and instrument counts were correct. The patient tolerated procedure well. The patient was taken to PACU in stable condition. Dr. Lyndsey Nolasco was present for the entire case.     Electronically signed by Harriet Cross DO on 7/1/2021 at 3:05 PM

## 2021-07-01 NOTE — ED NOTES
Writer placed page to general surgery consult (Dr Susan Dyson) at this time via perfect serve.       Collette Nakai, RN  07/01/21 9134

## 2021-07-01 NOTE — ED PROVIDER NOTES
[address]      Pt Name: Ghulam Steen  MRN: [id number]  [place] 1970  Date of evaluation: 7/1/2021      CHIEF COMPLAINT       Chief Complaint   Patient presents with    Abdominal Pain     right sided abd pain x2 days. reports n/v over past couple days. HISTORY OF PRESENT ILLNESS      The patient presents with right lower quadrant abdominal pain. Her pain started out diffusely a couple days ago but has gotten worse today. She saw her PCP today who sent her here. The patient has had nausea and vomiting and some mild loose stool. She declines medicine for nausea right now but would like some pain medicine. She has not had a fever. She has no prior abdominal surgeries. She denies chest pain or shortness of breath. She denies dysuria. Nothing makes her symptoms better or worse otherwise. REVIEW OF SYSTEMS       All systems reviewed and negative unless noted in HPI. The patient denies fever or constitutional symptoms. Denies vision change. Denies any sore throat or rhinorrhea. Denies any neck pain or stiffness. Denies chest pain or shortness of breath. Abdominal pain as noted in HPI. Denies any dysuria. Denies urinary frequency or hematuria. Denies musculoskeletal injury or pain. Denies any weakness, numbness or focal neurologic deficit. Denies any skin rash or edema. No recent psychiatric issues. No easy bruising or bleeding. Denies any polyuria, polydypsia or history of immunocompromise. PAST MEDICAL HISTORY    has a past medical history of Essential hypertension. SURGICAL HISTORY      has a past surgical history that includes Tonsillectomy. CURRENT MEDICATIONS       Previous Medications    LOSARTAN-HYDROCHLOROTHIAZIDE (HYZAAR) 50-12.5 MG PER TABLET           ALLERGIES     has No Known Allergies. FAMILY HISTORY     has no family status information on file.       family history is not on file.    SOCIAL HISTORY      reports that she has never smoked. She has never used smokeless tobacco. She reports current alcohol use. She reports that she does not use drugs. PHYSICAL EXAM     INITIAL VITALS:  height is 5' 11\" (1.803 m) and weight is 158.8 kg (350 lb) (abnormal). Her oral temperature is 98.6 °F (37 °C). Her blood pressure is 158/91 (abnormal) and her pulse is 104. Her respiration is 16 and oxygen saturation is 98%. The patient is alert and oriented, in no apparent distress. HEENT is atraumatic. Pupils are PERRL at 4 mm. Mucous membranes moist.    Neck is supple. Heart sounds regular rate and rhythm with no gallops, murmurs, or rubs. Lungs clear, no wheezes, rales or rhonchi. Abdomen: obese, soft, nontender with no pain to palpation. With tenderness noted in right lower quadrant only. No rebound or guarding. No peritoneal signs. Musculoskeletal exam shows no evidence of trauma. Normal distal pulses in all extremities. Skin: no rash or edema. Neurological exam reveals cranial nerves 2 through 12 grossly intact. Patient has equal  and normal deep tendon reflexes. Psychiatric: Appropriate  Lymphatics.:  No lymphadenopathy. DIFFERENTIAL DIAGNOSIS/ MDM:     Appendicitis, ovarian cyst, colitis    DIAGNOSTIC RESULTS       RADIOLOGY:   I reviewed the radiologist interpretations:  CT ABDOMEN PELVIS W IV CONTRAST Additional Contrast? None   Final Result   1. Findings consistent with acute appendicitis. No evidence for abscess   formation or perforation. 2. Lower abdomen/upper pelvic 17 cm solid soft tissue mass abutting the   uterine fundus likely a pedunculated leiomyoma. Given size, assessment by   ultrasound may be challenging. Non emergency MR imaging may be considered. Alternatively this may also be inspected during anticipated appendectomy.               CT ABDOMEN PELVIS W IV CONTRAST Additional Contrast? None (Final result)  Result time 07/01/21 small   bowel loops which could be some developing ileus.  Evaluation of the colon   shows no acute process. Pelvis: There is 11 x 17 x 16 cm lobulated solid slightly heterogeneous soft   tissue density mass in the pelvis rising almost at level of umbilicus.  This   abuts the uterine fundus.  This is probably a pedunculated leiomyoma. Peritoneum/Retroperitoneum: Right lower quadrant inflammatory changes. Shortly non pathologically enlarged retroperitoneal lymph nodes.      Bones/Soft Tissues: No acute abnormality of the bones.  The superficial soft   tissues show no significant abnormalities.                       LABS:  Results for orders placed or performed during the hospital encounter of 07/01/21   CBC Auto Differential   Result Value Ref Range    WBC 19.8 (H) 3.5 - 11.0 k/uL    RBC 4.37 4.0 - 5.2 m/uL    Hemoglobin 12.2 12.0 - 16.0 g/dL    Hematocrit 37.4 36 - 46 %    MCV 85.8 80 - 100 fL    MCH 27.9 26 - 34 pg    MCHC 32.5 31 - 37 g/dL    RDW 14.2 12.5 - 15.4 %    Platelets 668 284 - 630 k/uL    MPV 7.8 6.0 - 12.0 fL    NRBC Automated NOT REPORTED per 100 WBC    Differential Type NOT REPORTED     Seg Neutrophils 91 (H) 36 - 66 %    Lymphocytes 5 (L) 24 - 44 %    Monocytes 4 2 - 11 %    Eosinophils % 0 (L) 1 - 4 %    Basophils 0 0 - 2 %    Immature Granulocytes NOT REPORTED 0 %    Segs Absolute 17.90 (H) 1.8 - 7.7 k/uL    Absolute Lymph # 1.00 1.0 - 4.8 k/uL    Absolute Mono # 0.80 0.1 - 1.2 k/uL    Absolute Eos # 0.00 0.0 - 0.4 k/uL    Basophils Absolute 0.10 0.0 - 0.2 k/uL    Absolute Immature Granulocyte NOT REPORTED 0.00 - 0.30 k/uL    WBC Morphology NOT REPORTED     RBC Morphology NOT REPORTED     Platelet Estimate NOT REPORTED    Comprehensive Metabolic Panel   Result Value Ref Range    Glucose 135 (H) 70 - 99 mg/dL    BUN 12 6 - 20 mg/dL    CREATININE 0.85 0.50 - 0.90 mg/dL    Bun/Cre Ratio NOT REPORTED 9 - 20    Calcium 9.5 8.6 - 10.4 mg/dL    Sodium 129 (L) 135 - 144 mmol/L    Potassium 3.7 3.7 - 5.3 mmol/L    Chloride 94 (L) 98 - 107 mmol/L    CO2 23 20 - 31 mmol/L    Anion Gap 12 9 - 17 mmol/L    Alkaline Phosphatase 125 (H) 35 - 104 U/L    ALT 16 5 - 33 U/L    AST 17 <32 U/L    Total Bilirubin 0.96 0.3 - 1.2 mg/dL    Total Protein 8.0 6.4 - 8.3 g/dL    Albumin 4.2 3.5 - 5.2 g/dL    Albumin/Globulin Ratio 1.1 1.0 - 2.5    GFR Non-African American >60 >60 mL/min    GFR African American >60 >60 mL/min    GFR Comment          GFR Staging NOT REPORTED    HCG Qualitative, Serum   Result Value Ref Range    hCG Qual NEGATIVE NEGATIVE         EMERGENCY DEPARTMENT COURSE:   Vitals:    Vitals:    07/01/21 1008 07/01/21 1034 07/01/21 1104   BP: (!) 154/95  (!) 158/91   Pulse: 125  104   Resp: 16  16   Temp:  98.6 °F (37 °C)    TempSrc:  Oral    SpO2: 97%  98%   Weight: (!) 158.8 kg (350 lb)     Height: 5' 11\" (1.803 m)       -------------------------  BP: (!) 158/91, Temp: 98.6 °F (37 °C), Pulse: 104, Resp: 16      Re-evaluation Notes    The patient has been hemodynamically stable. She received antibiotics in the emergency department. The surgeon was seen here to see her in the department as well. She is admitted in stable condition. CONSULTS:    6727  Discussed with Dr. Devang Marin. Will see pt in ED. FINAL IMPRESSION      1. Acute appendicitis without peritonitis          DISPOSITION/PLAN   DISPOSITION        Condition on Disposition    stable    PATIENT REFERRED TO:  No follow-up provider specified.     DISCHARGE MEDICATIONS:  New Prescriptions    No medications on file       (Please note that portions of this note were completed with a voice recognition program.  Efforts were made to edit the dictations but occasionally words are mis-transcribed.)    Lisa Shrestha MD,, MD   Attending Emergency Physician         Ervin Boas, MD  07/01/21 5613

## 2021-07-01 NOTE — PROGRESS NOTES
Pt walked to bathroom with standby assist. Pt urinated in toilet and hat. Urine was bloody and pt stated that she was due to start her mensis and believes that is why it is bloody. Urine was sent to Lab for testing.

## 2021-07-02 VITALS
SYSTOLIC BLOOD PRESSURE: 121 MMHG | RESPIRATION RATE: 18 BRPM | HEIGHT: 71 IN | HEART RATE: 80 BPM | WEIGHT: 293 LBS | BODY MASS INDEX: 41.02 KG/M2 | TEMPERATURE: 97.9 F | DIASTOLIC BLOOD PRESSURE: 67 MMHG | OXYGEN SATURATION: 95 %

## 2021-07-02 PROCEDURE — 6360000002 HC RX W HCPCS: Performed by: STUDENT IN AN ORGANIZED HEALTH CARE EDUCATION/TRAINING PROGRAM

## 2021-07-02 PROCEDURE — 96372 THER/PROPH/DIAG INJ SC/IM: CPT

## 2021-07-02 PROCEDURE — 96366 THER/PROPH/DIAG IV INF ADDON: CPT

## 2021-07-02 PROCEDURE — G0378 HOSPITAL OBSERVATION PER HR: HCPCS

## 2021-07-02 PROCEDURE — 6370000000 HC RX 637 (ALT 250 FOR IP): Performed by: STUDENT IN AN ORGANIZED HEALTH CARE EDUCATION/TRAINING PROGRAM

## 2021-07-02 PROCEDURE — 2580000003 HC RX 258: Performed by: STUDENT IN AN ORGANIZED HEALTH CARE EDUCATION/TRAINING PROGRAM

## 2021-07-02 RX ADMIN — SODIUM CHLORIDE, PRESERVATIVE FREE 10 ML: 5 INJECTION INTRAVENOUS at 08:41

## 2021-07-02 RX ADMIN — CYCLOBENZAPRINE 10 MG: 10 TABLET, FILM COATED ORAL at 08:41

## 2021-07-02 RX ADMIN — HYDROCHLOROTHIAZIDE 12.5 MG: 25 TABLET ORAL at 08:41

## 2021-07-02 RX ADMIN — PIPERACILLIN AND TAZOBACTAM 3375 MG: 3; .375 INJECTION, POWDER, LYOPHILIZED, FOR SOLUTION INTRAVENOUS at 10:29

## 2021-07-02 RX ADMIN — LOSARTAN POTASSIUM 50 MG: 50 TABLET, FILM COATED ORAL at 08:41

## 2021-07-02 RX ADMIN — PIPERACILLIN AND TAZOBACTAM 3375 MG: 3; .375 INJECTION, POWDER, LYOPHILIZED, FOR SOLUTION INTRAVENOUS at 04:45

## 2021-07-02 RX ADMIN — ENOXAPARIN SODIUM 40 MG: 40 INJECTION SUBCUTANEOUS at 08:39

## 2021-07-02 ASSESSMENT — PAIN SCALES - GENERAL: PAINLEVEL_OUTOF10: 0

## 2021-07-02 NOTE — FLOWSHEET NOTE
SITUATION:  Writer met Patient while rounding the inpatient unit. ASSESSMENT:  Patient was lying in bed. Patient expressed that she is well and that she is \"resting from the surgery. \" Patient shared that her friends are her sources of support. Patient shared that she is \"ready to go home today. \"    INTERVENTION:  Writer introduced himself and his services; inquired about Pt's sources of support; inquired about Pt's feelings; offered encouragement. OUTCOME:  Patient expressed gratitude for visit. PLAN:  Writer will give referral to staff  to provide a follow-up visit/ Writer will attempt a follow up visit on a later date. 07/02/21 1121   Encounter Summary   Services provided to: Patient   Referral/Consult From: Dor[facility]; Family members   Continue Visiting   (7/2/21)   Complexity of Encounter Low   Length of Encounter 15 minutes   Routine   Type Initial   Assessment Calm; Approachable;Coping; Hopeful   Intervention Explored feelings, thoughts, concerns; Active listening;Sustaining presence/ Ministry of presence   Outcome Expressed gratitude;Engaged in conversation; Hopeful;Receptive       Electronically signed by Sameer Butlerin Intern on 7/2/2021 at 11:23 AM

## 2021-07-02 NOTE — CARE COORDINATION
Case Management Initial Discharge Plan  Jori Mcleod,             Met with:patient to discuss discharge plans. Information verified: address, contacts, phone number, , insurance Yes  Insurance Provider: Yony    Emergency Contact/Next of Kin name & number: Chioma Magana [phone]  Who are involved in patient's support system? friends    PCP: Antwon Burt MD  Date of last visit: past year      Discharge Planning    Living Arrangements:  Family Members     Patient able to perform ADL's:Independent    Current Services (outpatient & in home) none  DME equipment: none  DME provider: n/a    Is patient receiving oral anticoagulation therapy? No    Potential Assistance Needed:  N/A    Patient agreeable to home care: No  [place] of choice provided:  no    Prior SNF/Rehab Placement and Facility: no  Agreeable to SNF/Rehab: No  [place] of choice provided: n/a     Evaluation: n/a    Expected Discharge date:  21    Patient expects to be discharged to:  Home    If home: is the family and/or caregiver wiling & able to provide support at home? n/a patient is independent  Who will be providing this support? Follow Up Appointment: Best Day/ Time: Monday AM    Transportation provider: self  Transportation arrangements needed for discharge: No    Readmission Risk              Risk of Unplanned Readmission:  0             Does patient have a readmission risk score greater than 14?: No  If yes, follow-up appointment must be made within 7 days of discharge. Goals of Care:  Post -op care      Educated patient on transitional options, provided freedom of choice and are agreeable with plan      Discharge Plan: home, independent - needs f/u appt with Dr. Narda Russo.           Electronically signed by Fanny Marquez RN on 21 at 9:15 AM EDT

## 2021-07-02 NOTE — PROGRESS NOTES
PT Reviewed and given a copy of discharge instruction. Pt verbalized understanding of these instructions and all new medications. Pt was discharged via wheelchair with all personal belongings. Meds to beds delivered.

## 2021-07-02 NOTE — PLAN OF CARE
Problem: Falls - Risk of:  Goal: Will remain free from falls  Description: Will remain free from falls  7/2/2021 1336 by Serena Montgomery RN  Outcome: Completed     Problem: ABCDS Injury Assessment  Goal: Absence of physical injury  7/2/2021 1336 by Serena Montgomery RN  Outcome: Completed     Problem: Infection:  Goal: Will remain free from infection  Description: Will remain free from infection  7/2/2021 1336 by Serena Montgomery RN  Outcome: Completed     Problem: Safety:  Goal: Free from accidental physical injury  Description: Free from accidental physical injury  7/2/2021 1336 by Serena Montgomery RN  Outcome: Completed     Problem: Safety:  Goal: Free from intentional harm  Description: Free from intentional harm  7/2/2021 1336 by Serena Montgomery RN  Outcome: Completed     Problem: Daily Care:  Goal: Daily care needs are met  Description: Daily care needs are met  7/2/2021 1336 by Serena Montgomery RN  Outcome: Completed     Problem: Pain:  Goal: Patient's pain/discomfort is manageable  Description: Patient's pain/discomfort is manageable  7/2/2021 1336 by Serena Montgomery RN  Outcome: Completed     Problem: Skin Integrity:  Goal: Skin integrity will stabilize  Description: Skin integrity will stabilize  7/2/2021 1336 by Serena Montgomery RN  Outcome: Completed     Problem: Discharge Planning:  Goal: Patients continuum of care needs are met  Description: Patients continuum of care needs are met  7/2/2021 1336 by Serena Montgomery RN  Outcome: Completed

## 2021-07-02 NOTE — PROGRESS NOTES
CLINICAL PHARMACY NOTE: MEDS TO BEDS    Total # of Prescriptions Filled: 4   The following medications were delivered to the patient:  · Percocet 5-325  · [id number]  · Cyclobenzaprine 10mg  · augmentin 875    Additional Documentation:

## 2021-07-02 NOTE — PLAN OF CARE
Problem: Falls - Risk of:  Goal: Will remain free from falls  Description: Will remain free from falls  Outcome: Ongoing  Goal: Absence of physical injury  Description: Absence of physical injury  Outcome: Ongoing     Problem: ABCDS Injury Assessment  Goal: Absence of physical injury  Outcome: Ongoing    : Siderails up x 2  Hourly rounding  Call light in reach  Instructed to call for assist before attempting out of bed. Remains free from falls and accidental injury at this time   Floor free from obstacles  Bed is locked and in lowest position  Adequate lighting provided     Problem: Infection:  Goal: Will remain free from infection  Description: Will remain free from infection  Outcome: Ongoing    : Will frequently check for signs and symptoms of infection and address accordingly      Problem: Safety:  Goal: Free from accidental physical injury  Description: Free from accidental physical injury  Outcome: Ongoing  Goal: Free from intentional harm  Description: Free from intentional harm  Outcome: Ongoing    : Will maintain safe environment for patient, monitoring any unsafe behavior/environmental hazards      Problem: Daily Care:  Goal: Daily care needs are met  Description: Daily care needs are met  Outcome: Ongoing    : Will assist patient with their ADLs as needed      Problem: Pain:  Goal: Patient's pain/discomfort is manageable  Description: Patient's pain/discomfort is manageable  Outcome: Ongoing    : Will assess for pain throughout shift and administer pain medication as needed      Problem: Skin Integrity:  Goal: Skin integrity will stabilize  Description: Skin integrity will stabilize  Outcome: Ongoing    : Assessed patient's skin; will help ensure skin remains intact. Will assess. Problem: Discharge Planning:  Goal: Patients continuum of care needs are met  Description: Patients continuum of care needs are met  Outcome: Ongoing    :  Will ask open-ended questions to assess patient's understanding of plan of care.     : Patient is in bed with no needs at this time. Call light is within reach.  Will continue to monitor and assess hourly/PRN

## 2021-07-02 NOTE — DISCHARGE SUMMARY
Surgery Discharge Summary     Patient Identification  Marina Zhu is a 46 y.o. female. :  1970  Admit Date:  2021    Discharge date:   2021                                  Disposition: home    Discharge Diagnoses:   Patient Active Problem List   Diagnosis    Acute appendicitis       Condition on discharge: stable     Consults: none    Surgery: appendectomy 2021    Patient Instructions: Activity: no heavy lifting, pushing, pulling for 6 weeks, no driving for 2 weeks or while on analgesics  Diet: As tolerated  Follow-up with Dr. Daryl Olszewski in 1 week. See pre-printed instructions in chart and given to patient upon discharge. Discharge Medications:      Shayy Reese   Home Medication Instructions NBN:[id number]    Printed on:21 9659   Medication Information                      amoxicillin-clavulanate (AUGMENTIN) 875-125 MG per tablet  Take 1 tablet by mouth 2 times daily for 7 days             cyclobenzaprine (FLEXERIL) 10 MG tablet  Take 1 tablet by mouth 2 times daily as needed for Muscle spasms             docusate sodium (COLACE) 100 MG capsule  Take 1 capsule by mouth 2 times daily             losartan-hydroCHLOROthiazide (HYZAAR) 50-12.5 MG per tablet               oxyCODONE-acetaminophen (PERCOCET) 5-325 MG per tablet  Take 1 tablet by mouth every 6 hours as needed for Pain for up to 3 days. Intended supply: 3 days. Take lowest dose possible to manage pain                  HPI and Hospital Course:   46 y.o. female presented on 2021 for history and physical consistent with acute appendicitis. Patient taken emergently to operating room for laparoscopic robotic assisted appendectomy. Appendix was perforated with purulent peritonitis. Patient tolerated surgery with no apparent complications. Hospital course was unremarkable.  On day of discharge pt was tolerating regular diet, pain controlled with oral medications and ambulating without difficulty.       Electronically signed by Juana Peñaloza IV, DO on 7/2/2021 at 8:57 AM

## 2021-07-06 LAB — SURGICAL PATHOLOGY REPORT: NORMAL

## 2021-08-06 ENCOUNTER — HOSPITAL ENCOUNTER (OUTPATIENT)
Dept: NON INVASIVE DIAGNOSTICS | Age: 51
Discharge: HOME | End: 2021-08-08
Payer: COMMERCIAL

## 2021-08-06 DIAGNOSIS — R94.31 NONSPECIFIC ABNORMAL ELECTROCARDIOGRAM (ECG) (EKG): ICD-10-CM

## 2021-08-06 DIAGNOSIS — R06.02 SHORTNESS OF BREATH: ICD-10-CM

## 2021-08-06 DIAGNOSIS — Z01.810 PRE-OPERATIVE CARDIOVASCULAR EXAMINATION: ICD-10-CM

## 2021-08-06 DIAGNOSIS — I10 ESSENTIAL HYPERTENSION, MALIGNANT: ICD-10-CM

## 2021-08-06 LAB
LV EF: 55 %
LVEF MODALITY: NORMAL

## 2021-08-06 PROCEDURE — 93306 TTE W/DOPPLER COMPLETE: CPT

## 2021-08-31 ENCOUNTER — OFFICE VISIT (OUTPATIENT)
Dept: OBGYN CLINIC | Age: 51
End: 2021-08-31
Payer: COMMERCIAL

## 2021-08-31 VITALS — SYSTOLIC BLOOD PRESSURE: 120 MMHG | BODY MASS INDEX: 49.51 KG/M2 | WEIGHT: 293 LBS | DIASTOLIC BLOOD PRESSURE: 64 MMHG

## 2021-08-31 DIAGNOSIS — D21.9 FIBROIDS: Primary | ICD-10-CM

## 2021-08-31 DIAGNOSIS — N85.2 LARGE UTERUS: ICD-10-CM

## 2021-08-31 DIAGNOSIS — R10.2 PELVIC PAIN: ICD-10-CM

## 2021-08-31 PROCEDURE — 99213 OFFICE O/P EST LOW 20 MIN: CPT | Performed by: OBSTETRICS & GYNECOLOGY

## 2021-09-05 NOTE — PROGRESS NOTES
Nubia Schmitz  2021    YOB: 1970    The patient was seen today. She is here regarding enlarged fibroid uterus . Her bowels are regular and she is voiding without difficulty. HPI:  Nubia Schmitz is a 46 y.o. female      Pt here following CT that showed enlarged uterus with very large mass suspicious for fibroid. She states she had acute severe RLQ pain and was diagnosed with ruptured appendix. She had appendectomy by Dr. Claudene Larry done robotically laparoscopically. She states that she recovered well. During that time CT showed large fibroid on fundus of uterus. Results below:    1. Findings consistent with acute appendicitis.  No evidence for abscess   formation or perforation. 2. Lower abdomen/upper pelvic 17 cm solid soft tissue mass abutting the   uterine fundus likely a pedunculated leiomyoma.  Given size, assessment by   ultrasound may be challenging.  Non emergency MR imaging may be considered. Alternatively this may also be inspected during anticipated appendectomy. She states flow is not heavy. She bleeds for 3-4 days and has no concerns. She states that she does not want surgery unless absolutely necessary. She denies pelvic pain or discomfort at this time and states that she does not have limitations in activity or daily living. She states that she is concerned about the size, but we discussed that size will decrease after menopause. Discussed risks of endometrial cancer and she does have increased risk given her comorbidities and nuliparity. Recommend surgery however she really does not want this, and it is not absolutely necessary if we have no concern for malignancy. So, we discussed conservative plan.       OB History    Para Term  AB Living   0 0 0 0 0 0   SAB TAB Ectopic Molar Multiple Live Births   0 0 0 0 0 0       Past Medical History:   Diagnosis Date    Essential hypertension        Past Surgical History:   Procedure Laterality Date    LAPAROSCOPIC APPENDECTOMY  07/01/2021    robotic    LAPAROSCOPIC APPENDECTOMY N/A 7/1/2021    APPENDECTOMY LAPAROSCOPIC ROBOTIC performed by Glory Belle DO at [address] [place] [address] Place         No family history on file. Social History     Socioeconomic History    Marital status: Single     Spouse name: Not on file    Number of children: Not on file    Years of education: Not on file    Highest education level: Not on file   Occupational History    Not on file   Tobacco Use    Smoking status: Never Smoker    Smokeless tobacco: Never Used   Vaping Use    Vaping Use: Never used   Substance and Sexual Activity    Alcohol use: Yes     Comment: socially    Drug use: Never    Sexual activity: Not on file   Other Topics Concern    Not on file   Social History Narrative    Not on file     Social Determinants of Health     Financial Resource Strain:     Difficulty of Paying Living Expenses:    Food Insecurity:     Worried About Running Out of Food in the Last Year:     [address] in the Last Year:    Transportation Needs:     Lack of Transportation (Medical):      Lack of Transportation (Non-Medical):    Physical Activity:     Days of Exercise per Week:     Minutes of Exercise per Session:    Stress:     Feeling of Stress :    Social Connections:     Frequency of Communication with Friends and Family:     Frequency of Social Gatherings with Friends and Family:     Attends [employer]ism Services:     Active Member of Clubs or Organizations:     Attends Club or Organization Meetings:     Marital Status:    Intimate Partner Violence:     Fear of Current or Ex-Partner:     Emotionally Abused:     Physically Abused:     Sexually Abused:          MEDICATIONS:  Current Outpatient Medications   Medication Sig Dispense Refill    losartan-hydroCHLOROthiazide (HYZAAR) 50-12.5 MG per tablet       docusate sodium (COLACE) 100 MG capsule Take 1 capsule by mouth 2 times daily 20 capsule 0     No current facility-administered medications for this visit. ALLERGIES:  Allergies as of 08/31/2021    (No Known Allergies)         REVIEW OF SYSTEMS:       A minimum of an eleven point review of systems was completed. Review Of Systems (11 point):  Constitutional: No fever, chills or malaise; No weight change or fatigue  Head and Eyes: No vision, Headache, Dizziness or trauma in last 12 months  ENT ROS: No hearing, Tinnitis, sinus or taste problems  Hematological and Lymphatic ROS:No Lymphoma, Von Willebrand's, Hemophillia or Bleeding History  Psych ROS: No Depression, Homicidal thoughts,suicidal thoughts, or anxiety  Breast ROS: No prior breast abnormalities or lumps  Respiratory ROS: No SOB, Pneumoniae,Cough, or Pulmonary Embolism History  Cardiovascular ROS: No Chest Pain with Exertion, Palpitations, Syncope, Edema, Arrhythmia  Gastrointestinal ROS: No Indigestion, Heartburn, Nausea, vomiting, Diarrhea, Constipation,or Bowel Changes; No Bloody Stools or melena  Genito-Urinary ROS: No Dysuria, Hematuria or Nocturia. No Urinary Incontinence or Vaginal Discharge  Musculoskeletal ROS: No Arthralgia, Arthritis,Gout,Osteoporosis or Rheumatism  Neurological ROS: No CVA, Migraines, Epilepsy, Seizure Hx, or Limb Weakness  Dermatological ROS: No Rash, Itching, Hives, Mole Changes or Cancer          Blood pressure 120/64, weight (!) 355 lb (161 kg), last menstrual period 08/03/2021, not currently breastfeeding. Abdomen: Soft non-tender; good bowel sounds. No guarding, rebound or rigidity. No CVA tenderness bilaterally. Extremities: No calf tenderness, DTR 2/4, and No edema bilaterally    Pelvic: pt declined, prior exam unable to feel fibroid likely secondary to body habitus.     Diagnostics:  EXAMINATION:   CT OF THE ABDOMEN AND PELVIS WITH CONTRAST 7/1/2021 10:13 am       TECHNIQUE:   CT of the abdomen and pelvis was performed with the administration of   intravenous contrast. Multiplanar reformatted images are provided for review. Dose modulation, iterative reconstruction, and/or weight based adjustment of   the mA/kV was utilized to reduce the radiation dose to as low as reasonably   achievable.       COMPARISON:   None.       HISTORY:   ORDERING SYSTEM PROVIDED HISTORY: rlq pain   TECHNOLOGIST PROVIDED HISTORY:   rlq pain       Decision Support Exception - unselect if not a suspected or confirmed   emergency medical condition->Emergency Medical Condition (MA)   Reason for Exam: RLQ pain   Acuity: Unknown   Type of Exam: Unknown       FINDINGS:   Lower Chest: The visualized heart and lungs show no acute abnormalities.       Organs: Scattered hepatic cysts largest 4.5 cm in the left lobe.  Spleen,   pancreas, adrenal glands show no significant abnormalities.       1.8 cm left renal cyst in the lower pole.  No hydronephrosis.       GI/Bowel: There is limited evaluation due to absence of oral contrast.   Stomach collapsed otherwise unremarkable.  There is thickened appendix with   an appendicolith at the base.  There is a large amount of periappendiceal   inflammatory stranding and some free fluid most compatible with acute   appendicitis. Pradeep Nanny are few fluid-filled mildly dilated mid abdominal small   bowel loops which could be some developing ileus.  Evaluation of the colon   shows no acute process.       Pelvis: There is 11 x 17 x 16 cm lobulated solid slightly heterogeneous soft   tissue density mass in the pelvis rising almost at level of umbilicus.  This   abuts the uterine fundus.  This is probably a pedunculated leiomyoma.       Peritoneum/Retroperitoneum: Right lower quadrant inflammatory changes. Shortly non pathologically enlarged retroperitoneal lymph nodes.       Bones/Soft Tissues: No acute abnormality of the bones.  The superficial soft   tissues show no significant abnormalities.           Impression   1.  Findings consistent with acute appendicitis.  No evidence for abscess   formation or perforation. 2. Lower abdomen/upper pelvic 17 cm solid soft tissue mass abutting the   uterine fundus likely a pedunculated leiomyoma.  Given size, assessment by   ultrasound may be challenging.  Non emergency MR imaging may be considered. Alternatively this may also be inspected during anticipated appendectomy. Lab Results:  Results for orders placed or performed during the hospital encounter of 08/06/21   ECHO Complete 2D W Doppler W Color   Result Value Ref Range    Left Ventricular Ejection Fraction 55     LVEF MODALITY ECHO          Assessment:   Diagnosis Orders   1. Fibroids  MRI PELVIS W WO CONTRAST   2. Pelvic pain     3. Large uterus       Patient Active Problem List    Diagnosis Date Noted    Acute appendicitis 07/01/2021       PLAN:  Return in about 4 weeks (around 9/28/2021) for EMB. MRI pelvis  EMB indicated  Declining surgery  Repeat Annual every 1 year  Cervical Cytology Evaluation begins at 24years old. If Negative Cytology, Follow-up screening per current guidelines. Return to the office in 4 weeks. Counseled on preventative health maintenance follow-up. Orders Placed This Encounter   Procedures    MRI PELVIS W WO CONTRAST     Standing Status:   Future     Standing Expiration Date:   8/31/2022           The patient, Robson Mckenna is a 46 y.o. female, was seen with a total time spent of 30 minutes for the visit on this date of service by the E/M provider. The time component had both face to face and non face to face time spent in determining the total time component. Counseling and education regarding her diagnosis listed below and her options regarding those diagnoses were also included in determining her time component. Diagnosis Orders   1. Fibroids  MRI PELVIS W WO CONTRAST   2. Pelvic pain     3.  Large uterus          The patient had her preventative health maintenance recommendations and follow-up reviewed with her at the completion of her visit.

## 2021-09-13 ENCOUNTER — HOSPITAL ENCOUNTER (OUTPATIENT)
Dept: MRI IMAGING | Age: 51
Discharge: HOME | End: 2021-09-15
Payer: COMMERCIAL

## 2021-09-13 DIAGNOSIS — D21.9 FIBROIDS: ICD-10-CM

## 2021-09-13 LAB
CREAT SERPL-MCNC: 0.74 MG/DL (ref 0.5–0.9)
GFR AFRICAN AMERICAN: >60 ML/MIN
GFR NON-AFRICAN AMERICAN: >60 ML/MIN
GFR SERPL CREATININE-BSD FRML MDRD: NORMAL ML/MIN/{1.73_M2}
GFR SERPL CREATININE-BSD FRML MDRD: NORMAL ML/MIN/{1.73_M2}

## 2021-09-13 PROCEDURE — 72197 MRI PELVIS W/O & W/DYE: CPT

## 2021-09-13 PROCEDURE — 6360000004 HC RX CONTRAST MEDICATION: Performed by: OBSTETRICS & GYNECOLOGY

## 2021-09-13 PROCEDURE — 2580000003 HC RX 258: Performed by: OBSTETRICS & GYNECOLOGY

## 2021-09-13 PROCEDURE — 82565 ASSAY OF CREATININE: CPT

## 2021-09-13 PROCEDURE — A9579 GAD-BASE MR CONTRAST NOS,1ML: HCPCS | Performed by: OBSTETRICS & GYNECOLOGY

## 2021-09-13 PROCEDURE — 36415 COLL VENOUS BLD VENIPUNCTURE: CPT

## 2021-09-13 RX ORDER — SODIUM CHLORIDE 0.9 % (FLUSH) 0.9 %
10 SYRINGE (ML) INJECTION PRN
Status: DISCONTINUED | OUTPATIENT
Start: 2021-09-13 | End: 2021-09-16 | Stop reason: HOSPADM

## 2021-09-13 RX ORDER — 0.9 % SODIUM CHLORIDE 0.9 %
80 INTRAVENOUS SOLUTION INTRAVENOUS ONCE
Status: COMPLETED | OUTPATIENT
Start: 2021-09-13 | End: 2021-09-13

## 2021-09-13 RX ADMIN — SODIUM CHLORIDE, PRESERVATIVE FREE 10 ML: 5 INJECTION INTRAVENOUS at 07:33

## 2021-09-13 RX ADMIN — GADOTERIDOL 20 ML: 279.3 INJECTION, SOLUTION INTRAVENOUS at 07:33

## 2021-09-13 RX ADMIN — SODIUM CHLORIDE 80 ML: 9 INJECTION, SOLUTION INTRAVENOUS at 07:33

## 2021-10-01 ENCOUNTER — HOSPITAL ENCOUNTER (OUTPATIENT)
Age: 51
Setting detail: SPECIMEN
Discharge: HOME | End: 2021-10-01
Payer: COMMERCIAL

## 2021-10-01 ENCOUNTER — PROCEDURE VISIT (OUTPATIENT)
Dept: OBGYN CLINIC | Age: 51
End: 2021-10-01
Payer: COMMERCIAL

## 2021-10-01 VITALS
DIASTOLIC BLOOD PRESSURE: 72 MMHG | BODY MASS INDEX: 49.58 KG/M2 | RESPIRATION RATE: 16 BRPM | SYSTOLIC BLOOD PRESSURE: 122 MMHG | WEIGHT: 293 LBS

## 2021-10-01 DIAGNOSIS — D21.9 FIBROIDS: Primary | ICD-10-CM

## 2021-10-01 DIAGNOSIS — N85.2 LARGE UTERUS: ICD-10-CM

## 2021-10-01 DIAGNOSIS — Z01.812 PRE-PROCEDURE LAB EXAM: ICD-10-CM

## 2021-10-01 DIAGNOSIS — R10.2 PELVIC PAIN: ICD-10-CM

## 2021-10-01 LAB
CONTROL: NORMAL
PREGNANCY TEST URINE, POC: NEGATIVE

## 2021-10-01 PROCEDURE — 58100 BIOPSY OF UTERUS LINING: CPT | Performed by: OBSTETRICS & GYNECOLOGY

## 2021-10-01 PROCEDURE — 81025 URINE PREGNANCY TEST: CPT | Performed by: OBSTETRICS & GYNECOLOGY

## 2021-10-01 NOTE — PROGRESS NOTES
10/1/2021    Nicole Lafleur is a 46 y.o. female,       No LMP recorded. Patient is perimenopausal.    Chief Complaint   Patient presents with    Procedure     EMB       LABS:  UPT: negative    Procedure visit on 10/01/2021   Component Date Value Ref Range Status    Preg Test, Ur 10/01/2021 negative   Final   Hospital Outpatient Visit on 2021   Component Date Value Ref Range Status    CREATININE 2021 0.74  0.50 - 0.90 mg/dL Final    GFR Non- 2021 >60  >60 mL/min Final    GFR  2021 >60  >60 mL/min Final    GFR Comment 2021        Final    Comment: Average GFR for 52-63 years old:   80 mL/min/1.73sq m  Chronic Kidney Disease:   <60 mL/min/1.73sq m  Kidney failure:   <15 mL/min/1.73sq m              eGFR calculated using average adult body mass. Additional eGFR calculator available at:        [email or web address]            GFR Staging 2021 NOT REPORTED   Final   ]    Past Medical History:   Diagnosis Date    Essential hypertension          Past Surgical History:   Procedure Laterality Date    LAPAROSCOPIC APPENDECTOMY  2021    robotic    LAPAROSCOPIC APPENDECTOMY N/A 2021    APPENDECTOMY LAPAROSCOPIC ROBOTIC performed by Suellen Duran DO at [address] [place] [address]           No family history on file. Social History     Tobacco Use    Smoking status: Never Smoker    Smokeless tobacco: Never Used   Vaping Use    Vaping Use: Never used   Substance Use Topics    Alcohol use: Yes     Comment: socially    Drug use: Never         Current Outpatient Medications   Medication Sig Dispense Refill    docusate sodium (COLACE) 100 MG capsule Take 1 capsule by mouth 2 times daily 20 capsule 0    losartan-hydroCHLOROthiazide (HYZAAR) 50-12.5 MG per tablet        No current facility-administered medications for this visit.          Allergies as of 10/01/2021    (No Known Allergies)         Diagnostics:  MRI PELVIS W WO CONTRAST    Result Date: 9/13/2021  EXAMINATION: MRI OF THE PELVIS WITHOUT AND WITH CONTRAST, 9/13/2021 7:33 am TECHNIQUE: Multiplanar multisequence MRI of the pelvis was performed without and with the administration of 20 mL ProHance intravenous contrast. COMPARISON: CT on 07/01/2021 HISTORY: Uterine mass. FINDINGS: Image quality degraded by motion artifact. The uterus measures approximately 10.8 x 5 0 x 4.4 cm. Normal endometrium and junctional zone. Contiguous with the uterine fundus, there is large predominantly T2 hypointense mass with scattered areas of T2 hyperintense signal likely due to degeneration. This measures approximately 17.5 x 16.8 x 12.3 cm and demonstrates heterogeneous but avid enhancement. Normal ovaries containing small follicles. Minimal free fluid is likely physiologic. Mild disc bulges and desiccation in the lower lumbar spine. Bladder is not well distended but unremarkable. Visualized gastrointestinal tract is unremarkable. No lymphadenopathy. A large mass contiguous with the uterine fundus measuring 17.5 x 16.8 x 12.3 cm is compatible with a pedunculated fibroid. It demonstrates heterogeneous but avid enhancement and contains areas of degeneration. Blood pressure 122/72, resp. rate 16, weight (!) 355 lb 8 oz (161.3 kg), not currently breastfeeding. Chaperone for Intimate Exam   Chaperone was offered and accepted as part of the rooming process.  Chaperone: Lisbeth Brenner        The patient was counseled on the procedure. Risks, benefits and alternatives were reviewed. The patient is aware that this is diagnostic and not curative and a second procedure may be needed. A consent was reviewed and obtained. The patient was positioned comfortably on the exam table. After a bi-manual exam; the uterus was found to be  anteverted with a size of greater than 20cm with fundal fibroid.  A sterile speculum was placed into the vagina and the cervix was identified. It was stabilized with an allis clamp. It was cleansed with betadine and the aspirator was then gently passed into the endometrial cavity. Tissue was obtained and sent to pathology. The patient tolerated the procedure well. Post procedure restrictions were reviewed and given to the patient. .  All counts and instruments were correct at the end of the procedure. Assessment:   Diagnosis Orders   1. Fibroids  Specimen to Pathology Outpatient    97606 - [place] BIOPSY OF UTERUS LINING   2. Pelvic pain  Specimen to Pathology Outpatient    [id number] - [place] BIOPSY OF UTERUS LINING   3. Large uterus  Specimen to Pathology Outpatient    [id number] - [place] BIOPSY OF UTERUS LINING   4. Pre-procedure lab exam  POCT urine pregnancy     Patient Active Problem List    Diagnosis Date Noted    Acute appendicitis 07/01/2021           PLAN:  Restrictions, expectations, instructions, follow up discussed  No pain or complaints and wants to avoid surgery. MRI reviewed. If pathology benign can see annually. Recommend repeat MRI in 2 years to monitor fibroid. Pt understands and agrees with plan of care at this time. If any symptoms should change she will let us know immediately.

## 2021-10-05 LAB — SURGICAL PATHOLOGY REPORT: NORMAL

## 2022-05-24 ENCOUNTER — OFFICE VISIT (OUTPATIENT)
Dept: OBGYN CLINIC | Age: 52
End: 2022-05-24
Payer: COMMERCIAL

## 2022-05-24 VITALS
BODY MASS INDEX: 41.02 KG/M2 | WEIGHT: 293 LBS | SYSTOLIC BLOOD PRESSURE: 122 MMHG | HEIGHT: 71 IN | DIASTOLIC BLOOD PRESSURE: 64 MMHG

## 2022-05-24 DIAGNOSIS — N92.0 MENORRHAGIA WITH REGULAR CYCLE: Primary | ICD-10-CM

## 2022-05-24 DIAGNOSIS — R19.7 DIARRHEA, UNSPECIFIED TYPE: ICD-10-CM

## 2022-05-24 DIAGNOSIS — M54.9 BACK PAIN, UNSPECIFIED BACK LOCATION, UNSPECIFIED BACK PAIN LATERALITY, UNSPECIFIED CHRONICITY: ICD-10-CM

## 2022-05-24 DIAGNOSIS — N85.8 UTERINE MASS: ICD-10-CM

## 2022-05-24 DIAGNOSIS — R11.0 NAUSEA: ICD-10-CM

## 2022-05-24 DIAGNOSIS — N85.2 LARGE UTERUS: ICD-10-CM

## 2022-05-24 DIAGNOSIS — L70.9 ACNE, UNSPECIFIED ACNE TYPE: ICD-10-CM

## 2022-05-24 DIAGNOSIS — N39.46 URINARY INCONTINENCE, MIXED: ICD-10-CM

## 2022-05-24 DIAGNOSIS — D21.9 FIBROIDS: ICD-10-CM

## 2022-05-24 DIAGNOSIS — N94.3 PMS (PREMENSTRUAL SYNDROME): ICD-10-CM

## 2022-05-24 PROCEDURE — 99213 OFFICE O/P EST LOW 20 MIN: CPT | Performed by: OBSTETRICS & GYNECOLOGY

## 2022-05-24 NOTE — PROGRESS NOTES
Zahraa Kendrick  2022    YOB: 1970    The patient was seen today. She is here regarding myriad of symptoms and history of large uterine mass that is likely a fibroid. HPI:  Zahraa Kendrick is a 46 y.o. female      Pt. Has been seen previously in office after incidental finding of uterine mass was discovered when she had a ruptured appendix 2021. She did not know about this before that time. She was asymptomatic and EMB was done and benign. She is here today to discuss her concern of PMS symptoms and irritability before her period. She is having lower back pain that is worsened around the time of her period. She states that she is having acne and fatigue as well. She is having urinary urgency and frequency with stress incontinence and loss of bladder control. These symptoms seem tostart 3-4 days prior to her cycle and last through the 3rd day of her period. Also associated in nausea and diarrhea. Her periods are regular she states and they are becoming heavier  And shorter. She is scheduled [facility] bariatric surgery  at [facility].          OB History    Para Term  AB Living   0 0 0 0 0 0   SAB IAB Ectopic Molar Multiple Live Births   0 0 0 0 0 0       Past Medical History:   Diagnosis Date    Essential hypertension        Past Surgical History:   Procedure Laterality Date    LAPAROSCOPIC APPENDECTOMY  2021    robotic    LAPAROSCOPIC APPENDECTOMY N/A 2021    APPENDECTOMY LAPAROSCOPIC ROBOTIC performed by Anaya Peñaloza IV, DO at [address] [place] [address] Place         No family history on file.     Social History     Socioeconomic History    Marital status: Single     Spouse name: Not on file    Number of children: Not on file    Years of education: Not on file    Highest education level: Not on file   Occupational History    Not on file   Tobacco Use    Smoking status: Never Smoker    Smokeless tobacco: Never Used   Vaping Use    Vaping Use: Never used   Substance and Sexual Activity    Alcohol use: Yes     Comment: socially    Drug use: Never    Sexual activity: Not on file   Other Topics Concern    Not on file   Social History Narrative    Not on file     Social Determinants of Health     Financial Resource Strain:     Difficulty of Paying Living Expenses: Not on file   Food Insecurity:     Worried About Running Out of Food in the Last Year: Not on file    Fiona of Food in the Last Year: Not on file   Transportation Needs:     Lack of Transportation (Medical): Not on file    Lack of Transportation (Non-Medical): Not on file   Physical Activity:     Days of Exercise per Week: Not on file    Minutes of Exercise per Session: Not on file   Stress:     Feeling of Stress : Not on file   Social Connections:     Frequency of Communication with Friends and Family: Not on file    Frequency of Social Gatherings with Friends and Family: Not on file    Attends Taoism Services: Not on file    Active Member of [address] [place] [address] or Organizations: Not on file    Attends Club or Organization Meetings: Not on file    Marital Status: Not on file   Intimate Partner Violence:     Fear of Current or Ex-Partner: Not on file    Emotionally Abused: Not on file    Physically Abused: Not on file    Sexually Abused: Not on file   Housing Stability:     Unable to Pay for Housing in the Last Year: Not on file    Number of [place] in the Last Year: Not on file    Unstable Housing in the Last Year: Not on file         MEDICATIONS:  Current Outpatient Medications   Medication Sig Dispense Refill    losartan-hydroCHLOROthiazide (HYZAAR) 50-12.5 MG per tablet        No current facility-administered medications for this visit. ALLERGIES:  Allergies as of 05/24/2022    (No Known Allergies)         REVIEW OF SYSTEMS:       A minimum of an eleven point review of systems was completed.     Review Of Systems (11 point):  Constitutional: No fever, chills or malaise; No weight change or fatigue +fatigue   Head and Eyes: No vision, Headache, Dizziness or trauma in last 12 months  ENT ROS: No hearing, Tinnitis, sinus or taste problems  Hematological and Lymphatic ROS:No Lymphoma, Von Willebrand's, Hemophillia or Bleeding History  Psych ROS: No Depression, Homicidal thoughts,suicidal thoughts, or anxiety +PMS symptoms  Breast ROS: No prior breast abnormalities or lumps  Respiratory ROS: No SOB, Pneumoniae,Cough, or Pulmonary Embolism History  Cardiovascular ROS: No Chest Pain with Exertion, Palpitations, Syncope, Edema, Arrhythmia  Gastrointestinal ROS: No Indigestion, Heartburn, vomiting, Constipation,or Bowel Changes; No Bloody Stools or melena +Nausea +Diarrhea  Genito-Urinary ROS: No Dysuria, Hematuria or Nocturia. No Urinary Incontinence or Vaginal Discharge +urinary urgency and incontinence  Musculoskeletal ROS: No Arthralgia, Arthritis,Gout,Osteoporosis or Rheumatism  Neurological ROS: No CVA, Migraines, Epilepsy, Seizure Hx, or Limb Weakness  Dermatological ROS: No Rash, Itching, Hives, Mole Changes or Cancer    Blood pressure 122/64, height 5' 11\" (1.803 m), weight (!) 364 lb (165.1 kg), last menstrual period 05/24/2022, not currently breastfeeding. Abdomen: Soft non-tender; good bowel sounds. No guarding, rebound or rigidity. No CVA tenderness bilaterally. Extremities: No calf tenderness, DTR 2/4, and No edema bilaterally    Pelvic: Exam deferred. Diagnostics:  No results found. Lab Results:  Results for orders placed or performed during the hospital encounter of 10/01/21   SURGICAL PATHOLOGY REPORT   Result Value Ref Range    Surgical Pathology Report       -- Diagnosis --  ENDOMETRIUM, BIOPSY:       -FRAGMENTS OF BENIGN ENDOMETRIUM.       Rowe Phalen, M.D.  **Electronically Signed Out**         [facility]/10/5/2021       Clinical Information  Pre-op Diagnosis:  FIBROIDS, PELVIC PAIN, LARGE UTERUS Operative Findings:  EMB (PER CONTAINER)     Source of Specimen  1: EMB - PER CONTAINER    Gross Description  \"MARK Luke Root, EMB\" Clear to tan mucinous fragments, 2.5 x 2.0 x  0.3 cm in aggregate. Entirely 1cs. jg tm      Microscopic Description  Microscopic examination performed disclosing a small quantity of  endometrial tissue which is benign. Histologic features suggest weakly  secretory endometrium. No atypia or neoplasm is present. SURGICAL PATHOLOGY CONSULTATION       Patient Name: Leonila Green [facility] Rec: [id number]  Path Number: [id number]    [facility]  ANATOMIC PATHOLOGY  [address] [place] [address], [facility] [address]. [facility], [address]  [phone]  Fax: [phone]           Assessment:   Diagnosis Orders   1. Menorrhagia with regular cycle     2. Felicia Burr MD, Gynecologic Oncology, [place]   3. Large uterus  Diya Rey MD, Gynecologic Oncology, [place]   4. Back pain, unspecified back location, unspecified back pain laterality, unspecified chronicity     5. Acne, unspecified acne type     6. Nausea     7. Diarrhea, unspecified type     8. PMS (premenstrual syndrome)     9. Uterine mass  Diya Rey MD, Gynecologic Oncology, [place]   10. Urinary incontinence, mixed       Patient Active Problem List    Diagnosis Date Noted    Acute appendicitis 07/01/2021       PLAN:  Return in about 3 months (around 8/24/2022) for Discuss plan of care. Given myriad of complaints, size of mass and history of ruptured appendix. Discussed surgical procedure and risks. Discussed case with Dr. Ari Walker and will place GYN ONC referral for second opinion, evaluation, and possible surgical management. Bariatric surgery scheduled for July 11  Repeat Annual every 1 year  Cervical Cytology Evaluation begins at 24years old. If Negative Cytology, Follow-up screening per current guidelines.    Return to the office in 12 weeks. Counseled on preventative health maintenance follow-up. Orders Placed This Encounter   Procedures   Ciro Liang MD, Gynecologic Oncology, [place]     Referral Priority:   Routine     Referral Type:   Eval and Treat     Referral Reason:   Specialty Services Required     Referred to Provider:   Yolanda Lisa MD     Requested Specialty:   Gynecologic Oncology     Number of Visits Requested:   1           The patient, Zahraa Kendrick is a 46 y.o. female, was seen with a total time spent of 20 minutes for the visit on this date of service by the E/M provider. The time component had both face to face and non face to face time spent in determining the total time component. Counseling and education regarding her diagnosis listed below and her options regarding those diagnoses were also included in determining her time component. Diagnosis Orders   1. Menorrhagia with regular cycle     2. Noman Koch MD, Gynecologic Oncology, [place]   3. Large uterus  Ciro Liang MD, Gynecologic Oncology, [place]   4. Back pain, unspecified back location, unspecified back pain laterality, unspecified chronicity     5. Acne, unspecified acne type     6. Nausea     7. Diarrhea, unspecified type     8. PMS (premenstrual syndrome)     9. Uterine mass  Ciro Liang MD, Gynecologic Oncology, [place]   10. Urinary incontinence, mixed          The patient had her preventative health maintenance recommendations and follow-up reviewed with her at the completion of her visit.

## 2022-05-31 ENCOUNTER — OFFICE VISIT (OUTPATIENT)
Dept: GYNECOLOGIC ONCOLOGY | Age: 52
End: 2022-05-31
Payer: COMMERCIAL

## 2022-05-31 VITALS
TEMPERATURE: 97 F | RESPIRATION RATE: 97 BRPM | SYSTOLIC BLOOD PRESSURE: 150 MMHG | HEIGHT: 71 IN | WEIGHT: 293 LBS | BODY MASS INDEX: 41.02 KG/M2 | HEART RATE: 93 BPM | DIASTOLIC BLOOD PRESSURE: 80 MMHG

## 2022-05-31 DIAGNOSIS — D25.9 UTERINE LEIOMYOMA, UNSPECIFIED LOCATION: Primary | ICD-10-CM

## 2022-05-31 PROCEDURE — 99215 OFFICE O/P EST HI 40 MIN: CPT | Performed by: OBSTETRICS & GYNECOLOGY

## 2022-05-31 ASSESSMENT — ENCOUNTER SYMPTOMS
RESPIRATORY NEGATIVE: 1
DIARRHEA: 1
NAUSEA: 1

## 2022-05-31 NOTE — PROGRESS NOTES
[address] [facility], [facility], [address] [address]      I am seeing Ivette Jacinto for consultation at the request of Dr. Carol Hernández.    CC:  I have symptomatic fibroids    HPI  She is a 46 y.o. female who is being referred for uterine fibroids. The uterine mass was found incidentally July of 2021 at the time of a ruptured appendix. She had an EMB 10/1/2021 with \"fragments of benign endometrium. \" She had an MRI on 9/13/2021 which noted a uterus measuring 10.8 x 5.0 x 4.4 cm. Contiguous with the uterine fundus there is a large mass measuring approximately 17.5 x 16.8 x 12.3 cm compatible with a pedunculated fibroid. Normal ovaries containing small follicles. She was seen by Dr. Elizabeth Solomon on 5/24/2022 with a multitude of complaints including PMS symptoms, acne, fatigue, urinary urgency and frequency with stress incontinence and mild loss of bladder control, nausea, diarrhea. Due to the size of the uterus and fibroid she was referred to gyn oncology. Today she reports no pain and only experiencing urinary urgency and occasional incontinence. She denies heavy menstrual cycles and reports still have regular cycles. Reports past medical history of hypertension and is scheduled for bariatric surgery on 6/13 at [facility]. Review of Systems  I have personally reviewed and agree with the review of systems done by my ancillary staff in the [facility] documentation.       Past Medical History:   Diagnosis Date    Essential hypertension      Obesity    Past Surgical History:   Procedure Laterality Date    LAPAROSCOPIC APPENDECTOMY  07/01/2021    robotic    LAPAROSCOPIC APPENDECTOMY N/A 7/1/2021    APPENDECTOMY LAPAROSCOPIC ROBOTIC performed by Kandi Peñaloza IV, DO at [address] [place] [address] Place           Family history negative for significant history of cancers      OBJECTIVE:  Vitals:    05/31/22 0814   BP: (!) 149/80   Site: Left Lower Arm   Pulse: 93   Resp: (!) 97 Temp: 97 °F (36.1 °C)   Weight: (!) 362 lb (164.2 kg)   Height: 5' 11\" (1.803 m)       General: Well appearing, alert and oriented x3, no acute distress    HEENT:  EOM intact, CHLOÉ, no cervical or supraclavicular lymphadenopathy. No Thyromegaly. Heart: Auscultation of heart revels a regular rate with no murmur, gallops, or rubs. Lungs:  Clear bilaterally to auscultation to the bases. CVA: No tenderness. Abdomen: obese, soft, non tender, non distended. Uterine fundus palpated below the umbilicus    Lower Extremities:  No lymphedema, no calf tenderness, no musculoskeletal deformities. Pelvic Exam:    External genitalia: General appearance; normal, Hair distribution; normal, Lesions absent   Urinary system: urethral meatus normal   Vaginal: normal mucosa, no discharge   Cervix: normal   Adnexa: normal adnexa in size, nontender and no masses   Uterus: non tender, enlarged, mobile, 16-18 weeks size        Assessment: This is an obese 45 yo woman with symptomatic fibroid tumor of her uterus. She has mostly pelvic pressure and urinary symptoms. No pain. No abnormal bleeding. She continues to menstruate every month. EMBx negative for cancer or hyperplasia. [facility] In my opinion, the likelihood for uterine cancer (ie sarcoma) in this particular case is quite low. I counseled this patient extensively today. I spent 65 minutes on this case in the office today. I presented her with all of her options now (surgical v non surgical). I answered all of her questions to her satisfaction today. She tells me that she fully understands the situation and findings and options for her symptomatic fibroids moving forward.     Plan    1)  She will proceed with bariatric surgery in the weeks ahead  2)  She is considering depot lupron therapy vs elagolix therapy with HRT add back (ie Myfembree)  3)  She will let me know in the days ahead how she would like to proceed with hormonal manipulation   4)  She will return to see me in September 2022 for evaluation and possible surgical planning  5)  She will call or return sooner as needed this summer    Electronically signed by Tomasa Butler DO on 5/31/22 at 8:26 AM EDT    YULI Hidalog MD  Attending, [address] [place] [address] Oncology

## 2022-05-31 NOTE — PROGRESS NOTES
Review of Systems   Constitutional: Negative. HENT:   Negative for lump/mass. Respiratory: Negative. Cardiovascular: Negative. Gastrointestinal: Positive for diarrhea and nausea. Endocrine: Negative for hot flashes. Genitourinary: Positive for bladder incontinence (around time of menstrual) and menstrual problem. Musculoskeletal: Negative. Skin: Negative. Hematological: Does not bruise/bleed easily.

## 2022-07-11 ENCOUNTER — OFFICE VISIT (OUTPATIENT)
Dept: OBGYN CLINIC | Age: 52
End: 2022-07-11
Payer: COMMERCIAL

## 2022-07-11 ENCOUNTER — HOSPITAL ENCOUNTER (OUTPATIENT)
Age: 52
Setting detail: SPECIMEN
Discharge: HOME | End: 2022-07-11

## 2022-07-11 VITALS
BODY MASS INDEX: 41.02 KG/M2 | HEIGHT: 71 IN | SYSTOLIC BLOOD PRESSURE: 122 MMHG | WEIGHT: 293 LBS | RESPIRATION RATE: 16 BRPM | DIASTOLIC BLOOD PRESSURE: 70 MMHG

## 2022-07-11 DIAGNOSIS — N85.8 UTERINE MASS: ICD-10-CM

## 2022-07-11 DIAGNOSIS — N85.2 LARGE UTERUS: ICD-10-CM

## 2022-07-11 DIAGNOSIS — N92.0 MENORRHAGIA WITH REGULAR CYCLE: ICD-10-CM

## 2022-07-11 DIAGNOSIS — R10.2 PELVIC PAIN: ICD-10-CM

## 2022-07-11 DIAGNOSIS — Z12.31 SCREENING MAMMOGRAM, ENCOUNTER FOR: ICD-10-CM

## 2022-07-11 DIAGNOSIS — D21.9 FIBROIDS: ICD-10-CM

## 2022-07-11 DIAGNOSIS — Z01.419 PAP SMEAR, AS PART OF ROUTINE GYNECOLOGICAL EXAMINATION: Primary | ICD-10-CM

## 2022-07-11 PROCEDURE — 99396 PREV VISIT EST AGE 40-64: CPT | Performed by: OBSTETRICS & GYNECOLOGY

## 2022-07-11 RX ORDER — RELUGOLIX, ESTRADIOL HEMIHYDRATE, AND NORETHINDRONE ACETATE 40; 1; .5 MG/1; MG/1; MG/1
1 TABLET, FILM COATED ORAL DAILY
Qty: 30 TABLET | Refills: 4 | Status: SHIPPED | OUTPATIENT
Start: 2022-07-11

## 2022-07-11 NOTE — PROGRESS NOTES
History and Physical    Cedric Love  2022              46 y.o. Chief Complaint   Patient presents with    Annual Exam       No LMP recorded. Patient is perimenopausal.             Primary Care Physician: Dayanna Andrew MD    The patient was seen and examined. She has no chief complaint today and is here for her annual exam.  Her bowels are regular. There are no voiding complaints. She denies any bloating. She denies vaginal discharge and was counseled on STD's and the need for barrier contraception. HPI : Cedric Love is a 46 y.o. female     She is feeling well. She is approximately 4 weeks out from bariatric surgery and state she is recovering well. She has known large uterine mass, benign fibroid associated with history of heavy bleeding, pelvic pressure/pain. She has seen GYN Oncology for second opinion given size and appearance. Agree with Lupron vs. Myfembree with enlarged fibroid uterus with AUB/HMB and pain. Counseled on R/B/A of Myfembree including patient satisfaction and success rates as well as side effects    She is not sexually active, denies discharge and declining cultures. Bowel and bladder are working well without complaint.    ________________________________________________________________________  [facility] History    Para Term  AB Living   0 0 0 0 0 0   SAB IAB Ectopic Molar Multiple Live Births   0 0 0 0 0 0     Past Medical History:   Diagnosis Date    Essential hypertension                                                                    Past Surgical History:   Procedure Laterality Date    LAPAROSCOPIC APPENDECTOMY  2021    robotic    LAPAROSCOPIC APPENDECTOMY N/A 2021    APPENDECTOMY LAPAROSCOPIC ROBOTIC performed by Torey Peñaloza IV, DO at [address] [place] [address] Place       No family history on file.   Social History     Socioeconomic History    Marital status: Single     Spouse name: Not on file    Number of children: Not on file    Years of education: Not on file    Highest education level: Not on file   Occupational History    Not on file   Tobacco Use    Smoking status: Never Smoker    Smokeless tobacco: Never Used   Vaping Use    Vaping Use: Never used   Substance and Sexual Activity    Alcohol use: Yes     Comment: socially    Drug use: Never    Sexual activity: Not on file   Other Topics Concern    Not on file   Social History Narrative    Not on file     Social Determinants of Health     Financial Resource Strain:     Difficulty of Paying Living Expenses: Not on file   Food Insecurity:     Worried About Running Out of Food in the Last Year: Not on file    Fiona of Food in the Last Year: Not on file   Transportation Needs:     Lack of Transportation (Medical): Not on file    Lack of Transportation (Non-Medical):  Not on file   Physical Activity:     Days of Exercise per Week: Not on file    Minutes of Exercise per Session: Not on file   Stress:     Feeling of Stress : Not on file   Social Connections:     Frequency of Communication with Friends and Family: Not on file    Frequency of Social Gatherings with Friends and Family: Not on file    Attends Evangelical Services: Not on file    Active Member of [address] [place] [address] or Organizations: Not on file    Attends Club or Organization Meetings: Not on file    Marital Status: Not on file   Intimate Partner Violence:     Fear of Current or Ex-Partner: Not on file    Emotionally Abused: Not on file    Physically Abused: Not on file    Sexually Abused: Not on file   Housing Stability:     Unable to Pay for Housing in the Last Year: Not on file    Number of [place] in the Last Year: Not on file    Unstable Housing in the Last Year: Not on file       MEDICATIONS:  Current Outpatient Medications   Medication Sig Dispense Refill    Relugolix-Estradiol-Norethind (MYFEMBREE) 40-1-0.5 MG TABS Take 1 tablet by mouth daily 30 tablet 4    losartan-hydroCHLOROthiazide (HYZAAR) 50-12.5 MG per tablet        No current facility-administered medications for this visit. ALLERGIES:  Allergies as of 07/11/2022    (No Known Allergies)       Symptoms of decreased mood absent    Immunization status: stated as current, but no records available. Gynecologic History:     No LMP recorded. Patient is perimenopausal.    Sexually Active: No    STD History: No     Permanent Sterilization: No   Reversible Birth Control: No        Hormone Replacement Exposure: No      Genetic Qualified Family History of Breast, Ovarian , Colon or Uterine Cancer: No     If YES see scanned worksheet. Preventative Health Testing:    Health Maintenance:  Health Maintenance Due   Topic Date Due    Depression Screen  Never done    HIV screen  Never done    Hepatitis C screen  Never done    DTaP/Tdap/Td vaccine (1 - Tdap) Never done    Diabetes screen  Never done    Colorectal Cancer Screen  Never done    Shingles vaccine (1 of 2) Never done       Date of Last Pap Smear: 6/2021  Abnormal Pap Smear History: denies  Colposcopy History:   Date of Last Mammogram: 8/2021  Date of Last Colonoscopy:   Date of Last Bone Density:      ________________________________________________________________________        REVIEW OF SYSTEMS:       A minimum of an eleven point review of systems was completed. Review Of Systems (11 point):  Constitutional: No fever, chills or malaise;  No weight change or fatigue  Head and Eyes: No vision, Headache, Dizziness or trauma in last 12 months  ENT ROS: No hearing, Tinnitis, sinus or taste problems  Hematological and Lymphatic ROS:No Lymphoma, Von Willebrand's, Hemophillia or Bleeding History  Psych ROS: No Depression, Homicidal thoughts,suicidal thoughts, or anxiety  Breast ROS: No prior breast abnormalities or lumps  Respiratory ROS: No SOB, Pneumoniae,Cough, or Pulmonary Embolism History  Cardiovascular ROS: No Chest Pain with Exertion, tenderness, edema, or varicosities. There was full range of motion in all four extremities. Pulses in all four extremities were appreciated and are 2/4. Abdomen: The abdomen was soft and non-tender. There were good bowel sounds in all quadrants and there was no guarding, rebound or rigidity. Abdominal Scars:     Psych: The patient had a normal Orientation to: Time, Place, Person, and Situation  There is no Mood / Affect changes    Breast:  (Chest)  normal appearance, no masses or tenderness  Self breast exams were reviewed in detail. Literature was given. Pelvic Exam:  Vulva and vagina appear normal. Cervix posterior. No pain with bimanual exam.  Uterus is bulky and enlarged, decreased mobility, likely secondary to size. Approx 18wk size uterus. Rectal Exam:  exam declined by patient          Musculosk:  Normal Gait and station was noted. Digits were evaluated without abnormal findings. Range of motion, stability and strength were evaluated and found to be appropriate for the patients age. OMM Structural Component:  The patient did not complain of a Chief complaint requiring OMM. Chief Complaint:none    Structural Exam: No Interest      ASSESSMENT:      46 y.o. Annual   Diagnosis Orders   1. Pap smear, as part of routine gynecological examination  PAP Smear   2. Screening mammogram, encounter for  WILLIAM DIGITAL SCREEN W OR WO CAD BILATERAL   3. Menorrhagia with regular cycle  Relugolix-Estradiol-Norethind (MYFEMBREE) 40-1-0.5 MG TABS   4. Fibroids  Relugolix-Estradiol-Norethind (MYFEMBREE) 40-1-0.5 MG TABS   5. Pelvic pain  Relugolix-Estradiol-Norethind (MYFEMBREE) 40-1-0.5 MG TABS   6. Uterine mass  Relugolix-Estradiol-Norethind (MYFEMBREE) 40-1-0.5 MG TABS   7.  Large uterus  Relugolix-Estradiol-Norethind (MYFEMBREE) 40-1-0.5 MG TABS          Chief Complaint   Patient presents with    Annual Exam          Past Medical History:   Diagnosis Date    Essential hypertension          Patient Active Problem List    Diagnosis Date Noted    Acute appendicitis 07/01/2021          Hereditary Breast, Ovarian, Colon and Uterine Cancer screening Done. Tobacco & Secondary smoke risks reviewed; instructed on cessation and avoidance      Counseling Completed:  Preventative Health Recommendations and Follow up. PLAN:  Return in about 4 months (around 11/11/2022) for med check follow up. Myfembree trial, samples given, Rx ordered, patient information given. Repeat Annual every 1 year  Cervical Cytology Evaluation begins at 24years old. If Negative Cytology, Follow-up screening per current guidelines. Mammograms every 1 year. If 35 yo and last mammogram was negative. Calcium and Vitamin D dosing reviewed. Colonoscopy screening reviewed as well as onset for bone density testing. STD counseling and prevention reviewed. Routine health maintenance per patients PCP. Orders Placed This Encounter   Procedures    WILLIAM DIGITAL SCREEN W OR WO CAD BILATERAL     Standing Status:   Future     Standing Expiration Date:   9/10/2023     Order Specific Question:   Reason for exam:     Answer:   annual screening mammogram    PAP Smear     Patient History:    No LMP recorded. Patient is perimenopausal.  OBGYN Status: Perimenopausal  Past Surgical History:  07/01/2021: LAPAROSCOPIC APPENDECTOMY      Comment:  robotic  7/1/2021: LAPAROSCOPIC APPENDECTOMY; N/A      Comment:  APPENDECTOMY LAPAROSCOPIC ROBOTIC performed by Gabby Peñaloza IV,  at [address]  No date: TONSILLECTOMY      Social History    Tobacco Use      Smoking status: Never Smoker      Smokeless tobacco: Never Used       Standing Status:   Future     Standing Expiration Date:   7/11/2023     Order Specific Question:   Collection Type     Answer:    Thin Prep     Order Specific Question:   Prior Abnormal Pap Test     Answer:   No     Order Specific Question:   Screening or Diagnostic     Answer:   Screening Order Specific Question:   HPV Requested? Answer:   Yes     Order Specific Question:   High Risk Patient     Answer:   N/A           The patient, Surya Ford is a 46 y.o. female, was seen with a total time spent of 30 minutes for the visit on this date of service by the E/M provider. The time component had both face to face and non face to face time spent in determining the total time component. Counseling and education regarding her diagnosis listed below and her options regarding those diagnoses were also included in determining her time component. Diagnosis Orders   1. Pap smear, as part of routine gynecological examination  PAP Smear   2. Screening mammogram, encounter for  WILLIAM DIGITAL SCREEN W OR WO CAD BILATERAL   3. Menorrhagia with regular cycle  Relugolix-Estradiol-Norethind (MYFEMBREE) 40-1-0.5 MG TABS   4. Fibroids  Relugolix-Estradiol-Norethind (MYFEMBREE) 40-1-0.5 MG TABS   5. Pelvic pain  Relugolix-Estradiol-Norethind (MYFEMBREE) 40-1-0.5 MG TABS   6. Uterine mass  Relugolix-Estradiol-Norethind (MYFEMBREE) 40-1-0.5 MG TABS   7. Large uterus  Relugolix-Estradiol-Norethind (MYFEMBREE) 40-1-0.5 MG TABS        The patient had her preventative health maintenance recommendations and follow-up reviewed with her at the completion of her visit.

## 2022-07-13 ENCOUNTER — TELEPHONE (OUTPATIENT)
Dept: GYNECOLOGIC ONCOLOGY | Age: 52
End: 2022-07-13

## 2022-07-13 NOTE — TELEPHONE ENCOUNTER
Pt called stating that she had an appt with Dr. Zarina Dhillon on 7.11.22 and she has been started on myfreembry.

## 2022-07-15 LAB — CYTOLOGY REPORT: NORMAL

## 2022-07-25 ENCOUNTER — TELEPHONE (OUTPATIENT)
Dept: OBGYN CLINIC | Age: 52
End: 2022-07-25

## 2022-07-28 NOTE — TELEPHONE ENCOUNTER
If she is saturating pads every hour for multiple hours then I would be concerned. Otherwise I would attempt to complete the 6 week trial of the medication at a minimum. This can be normal as she just started it. She may still have periods but over time they statistically get lighter and more manageable. Thanks.

## 2022-09-01 ENCOUNTER — HOSPITAL ENCOUNTER (OUTPATIENT)
Dept: MAMMOGRAPHY | Age: 52
Discharge: HOME | End: 2022-09-03
Payer: COMMERCIAL

## 2022-09-01 DIAGNOSIS — Z12.31 SCREENING MAMMOGRAM, ENCOUNTER FOR: ICD-10-CM

## 2022-09-01 PROCEDURE — 77063 BREAST TOMOSYNTHESIS BI: CPT

## 2022-09-12 ENCOUNTER — TELEMEDICINE (OUTPATIENT)
Dept: OBGYN CLINIC | Age: 52
End: 2022-09-12
Payer: COMMERCIAL

## 2022-09-12 DIAGNOSIS — R10.2 PELVIC PAIN: Primary | ICD-10-CM

## 2022-09-12 DIAGNOSIS — N85.8 UTERINE MASS: ICD-10-CM

## 2022-09-12 DIAGNOSIS — N92.0 MENORRHAGIA WITH REGULAR CYCLE: ICD-10-CM

## 2022-09-12 DIAGNOSIS — D21.9 FIBROIDS: ICD-10-CM

## 2022-09-12 PROCEDURE — 99213 OFFICE O/P EST LOW 20 MIN: CPT | Performed by: OBSTETRICS & GYNECOLOGY

## 2022-09-12 RX ORDER — NORETHINDRONE ACETATE AND ETHINYL ESTRADIOL 1MG-20(21)
1 KIT ORAL DAILY
Qty: 2 PACKET | Refills: 0 | Status: SHIPPED | OUTPATIENT
Start: 2022-09-12

## 2022-09-12 NOTE — PROGRESS NOTES
Surya Ford  2022    Surya Ford (:  1970) has requested an audio/video evaluation for the following concern(s):    1. Pelvic pain    2. Uterine mass    3. Menorrhagia with regular cycle    4. Fibroids          TELEHEALTH EVALUATION -- Audio/Visual (During [place] [address] public [facility] emergency)      Surya Ford is a 46 y.o. female Derik Reasoner      She was here to follow-up regarding her labs and diagnostics ordered at her last visit for the diagnosis of:    ICD-10-CM    1. Pelvic pain  R10.2 norethindrone-ethinyl estradiol (LOESTRIN FE ) 1-20 MG-MCG per tablet      2. Uterine mass  N85.8 norethindrone-ethinyl estradiol (LOESTRIN FE ) 1-20 MG-MCG per tablet      3. Menorrhagia with regular cycle  N92.0 norethindrone-ethinyl estradiol (LOESTRIN FE ) 1-20 MG-MCG per tablet      4. Fibroids  D21.9 norethindrone-ethinyl estradiol (LOESTRIN FE ) 1-20 MG-MCG per tablet        Pt. Is here to talk about plan of care. She tried myfembree samples and was very happy with her decrease in heavy bleeding. Her decrease in s/s of anemia. She was happy with her decrease in urinary problems and decrease in pain. Her quality of life was improved and she was able to complete her ADLs even before and during her periods. Insurance denied this medication despite her need and risks of system synthetic hormones. Discussed plan of care with patient and she understands she will have to try hormone regulation with OCP, depo, or IUD at this time. She is open to to trying this and hopeful it will work. Past Medical History:   Diagnosis Date    Essential hypertension          Past Surgical History:   Procedure Laterality Date    LAPAROSCOPIC APPENDECTOMY  2021    robotic    LAPAROSCOPIC APPENDECTOMY N/A 2021    APPENDECTOMY LAPAROSCOPIC ROBOTIC performed by Sushila Berg DO at [address] [place] [address]           No family history on file.       Social History Tobacco Use    Smoking status: Never    Smokeless tobacco: Never   Vaping Use    Vaping Use: Never used   Substance Use Topics    Alcohol use: Yes     Comment: socially    Drug use: Never         MEDICATIONS:  Current Outpatient Medications   Medication Sig Dispense Refill    norethindrone-ethinyl estradiol (LOESTRIN FE 1/20) 1-20 MG-MCG per tablet Take 1 tablet by mouth daily 2 packet 0    Relugolix-Estradiol-Norethind (MYFEMBREE) 40-1-0.5 MG TABS Take 1 tablet by mouth daily 30 tablet 4    losartan-hydroCHLOROthiazide (HYZAAR) 50-12.5 MG per tablet        No current facility-administered medications for this visit. ALLERGIES:  Allergies as of 09/12/2022    (No Known Allergies)         not currently breastfeeding. PE is limited due to the virtual visit    Abdomen: Soft non-tender; good bowel sounds. No guarding, rebound or rigidity. No CVA tenderness bilaterally reported when questioned. (Viewed Virtually)    Extremities: No calf tenderness, DTR 2/4, and No edema bilaterally as inspected by video and palpation by the patient (Viewed Virtually)    Pelvic: (Virtual Visit-Not Completed)    Diagnostics:  [facility] DIGITAL SCREEN SELF REFERRAL W OR WO CAD BILATERAL    Result Date: 9/2/2022  EXAMINATION: SCREENING DIGITAL BILATERAL MAMMOGRAM WITH TOMOSYNTHESIS 9/1/2022 TECHNIQUE: Screening mammography of the bilateral breasts was performed with tomosynthesis. 2D standard and 3D tomosynthesis combination imaging performed through both breasts in the MLO and CC projection. Computer aided detection was utilized in the interpretation of this exam. COMPARISON: Mammographic imaging from an outside source dated August 21, 2021 and August 4, 2018. HISTORY: Screening. Screening. The patient indicates that her weight is almost 300 pounds. FINDINGS: The breasts are heterogeneously dense, which may obscure small masses.  Within that limitation, there is no new dominant mass, suspicious microcalcification, or area of architectural distortion. 13 mm diameter asymmetric density is again apparent anterior location right breast approximately 12 o'clock and 3.5 cm from the nipple. This is unchanged. Unremarkable study of the breasts with no evidence of significant interval change. BIRADS: BIRADS - CATEGORY 2 Benign Findings. Normal interval follow-up is recommended in 12 months. OVERALL ASSESSMENT - BENIGN A letter of notification will be sent to the patient regarding the results. The [employer] recommends annual mammograms for women 40 years and older. Lab Results:  Results for orders placed or performed during the hospital encounter of 07/11/22   GYN Cytology   Result Value Ref Range    Cytology Report       INTERPRETATION    Cervical material, (ThinPrep vial, Imaging-assisted review):  Specimen Adequacy:       Satisfactory for evaluation.       - Endocervical/transformation zone component present. Descriptive Diagnosis:       Negative for intraepithelial lesion or malignancy. Cytotechnologist:   ERAN MADSEN(ASCP)  **Electronically Signed Out**  ey/7/15/2022        Procedure/Addendum  HPV Procedure Report       Date Ordered:     7/12/2022     Status: Signed Out       Date Complete:     7/13/2022     By: System Interface       Date Reported:     7/13/2022              Sample:  HPV Type 16      Result:   Not Detected      Ref Range:  (Not Detected)  Sample:  HPV Type 18      Result:   Not Detected      Ref Range: (Not  Detected)  Sample: Other High Risk HPV      Result:   Not Detected      Ref  Range: (Not Detected)  Sample:  HPV Interp      Result:         Ref Range: (Not Detected)  This test amplifies and detects DNA of 14 high-risk HPV types  associated with cervical ca ncer and its precursor lesions   (HPV types 16,18, 31, 33, 35, 39, 45, 51, 52, 56, 58, 59, 66, and  68).         Sensitivity may be affected by specimen collection methods, stage of  infection, and the presence of interfering   substances. Results should be interpreted in conjunction with other  available laboratory and clinical data. A negative   high-risk HPV result does not exclude the possibility of future  cytologic HSIL or underlying CIN2-3 or cancer. This test is intended for medical purposes only and is not valid for  the evaluation of suspected sexual abuse or for   other forensic purposes. Source:  A: Cervical material, (ThinPrep vial, Imaging-assisted review)    Clinical History  Perimenopausal  Z01.419 Routine gyn exam without abnormal findings  Co-Test:  ThinPrep Pap with high risk HPV testing    GYNECOLOGIC CYTOLOGY REPORT    Patient Name: Alayna Wilcox Med Rec: [id number]  Path Number: [id number]  [facility]  ANATOMIC PATHOLOGY  [address] [place] [address],  [address]. [facility], [address]  [phone]  Fax: [phone]     Human papillomavirus (HPV) DNA probe thin prep high risk   Result Value Ref Range    Specimen Description . CERVIX     HPV Sample . THIN PREP     HPV, Genotype 16 Not Detected Not Detected    HPV, Genotype 18 Not Detected Not Detected    HPV, High Risk Other Not Detected Not Detected    HPV, Interpretation                 Assessment:   Diagnosis Orders   1. Pelvic pain  norethindrone-ethinyl estradiol (LOESTRIN FE 1/20) 1-20 MG-MCG per tablet      2. Uterine mass  norethindrone-ethinyl estradiol (LOESTRIN FE 1/20) 1-20 MG-MCG per tablet      3. Menorrhagia with regular cycle  norethindrone-ethinyl estradiol (LOESTRIN FE 1/20) 1-20 MG-MCG per tablet      4. Fibroids  norethindrone-ethinyl estradiol (LOESTRIN FE 1/20) 1-20 MG-MCG per tablet        No chief complaint on file. Patient Active Problem List    Diagnosis Date Noted    Acute appendicitis 07/01/2021       PLAN:  Return in about 1 month (around 10/12/2022) for Follow up OCP. Loestrin ordered, combined OCP  Return to the office in 8 weeks.   Counseled on preventative health maintenance follow-up. No orders of the defined types were placed in this encounter. Ranjan Coello is a 46 y.o. female female was evaluated by a Virtual Visit (video visit) encounter to address concerns as mentioned above. A caregiver was present when appropriate. Due to this being a TeleHealth encounter (During [facility] public health emergency), evaluation of the following organ systems was limited: Vitals/Constitutional/EENT/Resp/CV/GI//MS/Neuro/Skin/Heme-Lymph-Imm. Pursuant to the emergency declaration under the [address] [place] [address], [address] [place] [address] authority and the [employer] Act, this Virtual Visit was conducted with patient's (and/or legal guardian's) consent, to reduce the patient's risk of exposure to COVID-19 and provide necessary medical care. The patient (and/or legal guardian) has also been advised to contact this office for worsening conditions or problems, and seek emergency medical treatment and/or call 911 if deemed necessary. Services were provided through a video synchronous discussion virtually to substitute for in-person clinic visit. Patient and provider were located at their individual homes. Electronically signed by Yannick Benjamin DO on 9/12/22 at 7:02 PM EDT     An electronic signature was used to authenticate this note. The Virtual Visit time of 20 minutes. More than 50% of this visit was counseling and education regarding The primary encounter diagnosis was Pelvic pain. Diagnoses of Uterine mass, Menorrhagia with regular cycle, and Fibroids were also pertinent to this visit. and No chief complaint on file. as well as  counseling on preventative health maintenance follow-up.

## 2022-09-21 ENCOUNTER — OFFICE VISIT (OUTPATIENT)
Dept: GYNECOLOGIC ONCOLOGY | Age: 52
End: 2022-09-21
Payer: COMMERCIAL

## 2022-09-21 VITALS
HEART RATE: 80 BPM | HEIGHT: 71 IN | BODY MASS INDEX: 39.76 KG/M2 | DIASTOLIC BLOOD PRESSURE: 87 MMHG | WEIGHT: 284 LBS | SYSTOLIC BLOOD PRESSURE: 136 MMHG | OXYGEN SATURATION: 99 %

## 2022-09-21 DIAGNOSIS — Z90.49 HISTORY OF APPENDECTOMY: ICD-10-CM

## 2022-09-21 DIAGNOSIS — Z98.84 BARIATRIC SURGERY STATUS: ICD-10-CM

## 2022-09-21 DIAGNOSIS — D25.9 UTERINE LEIOMYOMA, UNSPECIFIED LOCATION: ICD-10-CM

## 2022-09-21 PROBLEM — K35.80 ACUTE APPENDICITIS: Status: RESOLVED | Noted: 2021-07-01 | Resolved: 2022-09-21

## 2022-09-21 PROCEDURE — 99213 OFFICE O/P EST LOW 20 MIN: CPT | Performed by: OBSTETRICS & GYNECOLOGY

## 2022-09-21 RX ORDER — SPIRONOLACTONE 25 MG/1
TABLET ORAL DAILY
COMMUNITY
Start: 2022-09-13

## 2022-09-21 RX ORDER — ERGOCALCIFEROL 1.25 MG/1
CAPSULE ORAL
COMMUNITY
Start: 2022-08-25

## 2022-09-21 RX ORDER — SPIRONOLACTONE 50 MG/1
TABLET, FILM COATED ORAL DAILY
COMMUNITY
Start: 2022-08-11

## 2022-09-21 ASSESSMENT — ENCOUNTER SYMPTOMS
GASTROINTESTINAL NEGATIVE: 1
EYES NEGATIVE: 1
RESPIRATORY NEGATIVE: 1

## 2022-09-21 NOTE — PROGRESS NOTES
Review of Systems   Constitutional:  Positive for appetite change (gastric bypass). HENT:  Negative. Eyes: Negative. Respiratory: Negative. Cardiovascular: Negative. Gastrointestinal: Negative. Endocrine: Negative. Genitourinary: Negative. Musculoskeletal: Negative. Skin: Negative. Neurological: Negative. Hematological: Negative.

## 2022-09-21 NOTE — PROGRESS NOTES
[facility], [facility], [address] present for her follow-up for fibroids    CC/HPI: She is a 20-year-old female who was previously referred for large uterine fibroids. The uterine mass was found incidentally in July 2021 during surgical intervention for a ruptured appendix. She had an EMB on 10/1/2021 with \"fragments of benign endometrium. \"  She had an MRI on 9/13/2021 which noted a uterus measuring 10.8 x 5.0 x 4.5 cm. Contiguous with the uterine fundus there is a large mass measuring approximately 17.5 x 16.8 x 12.3 cm compatible with a pedunculated fibroid. Normal ovaries containing small follicles. She was then seen by Dr. Saul Joshi on 5/24/2022 with multiple complaints including urinary urgency frequency and stress urinary incontinence. Due to the size of the uterus and fibroids she was referred to gynecology oncology. She was seen by Dr. Nela Lane on 5/31/2022 complaints of urinary urgency and occasional incontinence. She denied heavy menstrual cycles for irregular cycles. At that time she was scheduled for bariatric surgery which she subsequently had performed on 6/13/2022. She has lost 78 pounds to date. She has followed up with Dr. Saul Joshi and has been on zeenworld which she states has worked well for her. She is no longer experiencing urinary disruption or incontinence. Denies abdominal pain or heavy vaginal bleeding. Patient states she is working with Dr. Saul Joshi and insurance to continue medical management. She reports she will potentially be interested in surgical management in the future approximately however not at this time. ROS:  I have personally reviewed and agree with the review of systems done by my ancillary staff in the [facility] documentation.       Physical Exam:    Vitals:    09/21/22 0851   BP: 136/87   Site: Left Lower Arm   Position: Sitting   Cuff Size: Medium Adult   Pulse: 80   SpO2: 99%   Weight: 284 lb (128.8 kg)   Height: 5' 11\" (1.803 m)     Physical Exam was deferred today     Assessment/Plan:  Impression: Uterine fibroids     Patient is following with Dr. Mario العلي and continuing medical management of large fibroid uterus. She declined surgical intervention at this time. Discussed that she will need surveillance in the years to come to ensure no growth of the fibroid/uterus which could indicate a cancer. Return in about 6 months (around 3/21/2023) for follow up . Electronically signed by Nicholas Fan DO on 9/21/22 at 9:18 AM EDT    Gyn Oncology Attending Note    Patient seen and evaluated by me today. She is doing well now. No gyn troubles reported today. She is working with her gynecologist Dr Mario العلي to get approval for long term MyFembree therapy for her fibroids. She is feeling well with her initial trial of this medication. She does not desire any hysterectomy at this time. I spent 25 minutes managing this case in the office today. The patient will return to see me in six months time. She will call or return sooner as needed.     Brenda Coombs MD

## 2022-11-06 DIAGNOSIS — R10.2 PELVIC PAIN: ICD-10-CM

## 2022-11-06 DIAGNOSIS — N85.8 UTERINE MASS: ICD-10-CM

## 2022-11-06 DIAGNOSIS — D21.9 FIBROIDS: ICD-10-CM

## 2022-11-06 DIAGNOSIS — N92.0 MENORRHAGIA WITH REGULAR CYCLE: ICD-10-CM

## 2022-11-08 RX ORDER — NORETHINDRONE ACETATE AND ETHINYL ESTRADIOL 1MG-20(21)
KIT ORAL
Qty: 56 TABLET | Refills: 3 | Status: SHIPPED
Start: 2022-11-08 | End: 2022-11-15

## 2022-11-15 ENCOUNTER — OFFICE VISIT (OUTPATIENT)
Dept: OBGYN CLINIC | Age: 52
End: 2022-11-15
Payer: COMMERCIAL

## 2022-11-15 VITALS
DIASTOLIC BLOOD PRESSURE: 82 MMHG | BODY MASS INDEX: 37.8 KG/M2 | HEIGHT: 71 IN | WEIGHT: 270 LBS | SYSTOLIC BLOOD PRESSURE: 122 MMHG

## 2022-11-15 DIAGNOSIS — R10.2 PELVIC PAIN: ICD-10-CM

## 2022-11-15 DIAGNOSIS — Z09 ENCOUNTER FOR FOLLOW-UP EXAMINATION: Primary | ICD-10-CM

## 2022-11-15 DIAGNOSIS — N92.0 MENORRHAGIA WITH REGULAR CYCLE: ICD-10-CM

## 2022-11-15 DIAGNOSIS — N94.6 DYSMENORRHEA: ICD-10-CM

## 2022-11-15 DIAGNOSIS — D25.9 UTERINE LEIOMYOMA, UNSPECIFIED LOCATION: ICD-10-CM

## 2022-11-15 PROCEDURE — 99213 OFFICE O/P EST LOW 20 MIN: CPT | Performed by: OBSTETRICS & GYNECOLOGY

## 2022-11-15 RX ORDER — RELUGOLIX, ESTRADIOL HEMIHYDRATE, AND NORETHINDRONE ACETATE 40; 1; .5 MG/1; MG/1; MG/1
1 TABLET, FILM COATED ORAL DAILY
Qty: 30 TABLET | Refills: 5 | Status: SHIPPED | OUTPATIENT
Start: 2022-11-15

## 2022-11-15 NOTE — PROGRESS NOTES
Juan Bhatt  11/15/2022    YOB: 1970    The patient was seen today. She is here regarding follow up as she has been taking OCP (Loestrin) since 2022. Her bowels are regular and she is voiding without difficulty. HPI:  Juan Bhatt is a 46 y.o. female      Pt seen 2022 HPI as follows \"Pt. Has been seen previously in office after incidental finding of uterine mass was discovered when she had a ruptured appendix 2021. She did not know about this before that time. She was asymptomatic and EMB was done and benign. She is here today to discuss her concern of PMS symptoms and irritability before her period. She is having lower back pain that is worsened around the time of her period. She states that she is having acne and fatigue as well. She is having urinary urgency and frequency with stress incontinence and loss of bladder control. These symptoms seem tostart 3-4 days prior to her cycle and last through the 3rd day of her period. Also associated in nausea and diarrhea. Her periods are regular she states and they are becoming heavier  And shorter. She is scheduled for bariatric surgery  at [facility].\"    Attempted to start myfembree 2022 for enlarged fibroid uterus and bleeding that has been getting heavier and starting to affect her ADLs. Denial from insurance received 2022. Pt was placed on OCP at that time and she has been taking as prescribed. However back pain, heavy bleeding and dysmenorrhea has continued without improvement. HPI 2022 \"Pt. Is here to talk about plan of care. She tried myfembree samples and was very happy with her decrease in heavy bleeding. Her decrease in s/s of anemia. She was happy with her decrease in urinary problems and decrease in pain. Her quality of life was improved and she was able to complete her ADLs even before and during her periods.   Insurance denied this medication despite her need and risks of system synthetic hormones. Discussed plan of care with patient and she understands she will have to try hormone regulation with OCP, depo, or IUD at this time. She is open to to trying this and hopeful it will work. \"    She states she would like to retry coverage for myfembree as she has failed OCP and has documented enlarged fibroid uterus with heavy, painful bleeding, and cramping. She is concerned about this as she has completed her bariatric surgery and is feeling well, but this is holding her back. She would like to avoid surgery at this time if possible. R/B/A of myfembree discussed in detail.       OB History    Para Term  AB Living   0 0 0 0 0 0   SAB IAB Ectopic Molar Multiple Live Births   0 0 0 0 0 0       Past Medical History:   Diagnosis Date    Essential hypertension     Menopausal symptoms        Past Surgical History:   Procedure Laterality Date    APPENDECTOMY  2021    BARIATRIC SURGERY  2022    [facility]    LAPAROSCOPIC APPENDECTOMY  2021    robotic    LAPAROSCOPIC APPENDECTOMY N/A 2021    APPENDECTOMY LAPAROSCOPIC ROBOTIC performed by Joseph Vizcarra DO at [address] [place] [address]         Family History   Problem Relation Age of Onset    Diabetes Mother     Hypertension Mother     Hypertension Father     Colon Cancer Paternal Grandmother        Social History     Socioeconomic History    Marital status: Single     Spouse name: Not on file    Number of children: Not on file    Years of education: Not on file    Highest education level: Not on file   Occupational History    Not on file   Tobacco Use    Smoking status: Never    Smokeless tobacco: Never   Vaping Use    Vaping Use: Never used   Substance and Sexual Activity    Alcohol use: Not Currently     Comment: socially    Drug use: Never    Sexual activity: Not Currently     Partners: Male   Other Topics Concern    Not on file   Social History Narrative    Not on file     Social Determinants of Health     Financial Resource Strain: Not on file   Food Insecurity: Not on file   Transportation Needs: Not on file   Physical Activity: Not on file   Stress: Not on file   Social Connections: Not on file   Intimate Partner Violence: Not on file   Housing Stability: Not on file         MEDICATIONS:  Current Outpatient Medications   Medication Sig Dispense Refill    Relugolix-Estradiol-Norethind (MYFEMBREE) 40-1-0.5 MG TABS Take 1 tablet by mouth daily 30 tablet 5    vitamin D (ERGOCALCIFEROL) 1.25 MG (60704 UT) CAPS capsule Twice a Week      spironolactone (ALDACTONE) 25 MG tablet daily      spironolactone (ALDACTONE) 50 MG tablet daily       No current facility-administered medications for this visit. ALLERGIES:  Allergies as of 11/15/2022    (No Known Allergies)         REVIEW OF SYSTEMS:       A minimum of an eleven point review of systems was completed. Review Of Systems (11 point):  Constitutional: No fever, chills or malaise; No weight change or fatigue  Head and Eyes: No vision, Headache, Dizziness or trauma in last 12 months  ENT ROS: No hearing, Tinnitis, sinus or taste problems  Hematological and Lymphatic ROS:No Lymphoma, Von Willebrand's, Hemophillia or Bleeding History  Psych ROS: No Depression, Homicidal thoughts,suicidal thoughts, or anxiety  Breast ROS: No prior breast abnormalities or lumps  Respiratory ROS: No SOB, Pneumoniae,Cough, or Pulmonary Embolism History  Cardiovascular ROS: No Chest Pain with Exertion, Palpitations, Syncope, Edema, Arrhythmia  Gastrointestinal ROS: No Indigestion, Heartburn, Nausea, vomiting, Diarrhea, Constipation,or Bowel Changes; No Bloody Stools or melena +pelvic pain  Genito-Urinary ROS: No Dysuria, Hematuria or Nocturia.  No Urinary Incontinence or Vaginal Discharge +HMB, +Dysmenorrhea +enlarged fibroid uterus  Musculoskeletal ROS: No Arthralgia, Arthritis,Gout,Osteoporosis or Rheumatism  Neurological ROS: No CVA, Migraines, Epilepsy, Seizure Hx, or Limb Weakness  Dermatological ROS: No Rash, Itching, Hives, Mole Changes or Cancer    Blood pressure 122/82, height 5' 11\" (1.803 m), weight 270 lb (122.5 kg), last menstrual period 08/12/2022, not currently breastfeeding. Abdomen: Soft non-tender; good bowel sounds. No guarding, rebound or rigidity. No CVA tenderness bilaterally. Extremities: No calf tenderness, DTR 2/4, and No edema bilaterally    Pelvic: Exam deferred. Diagnostics:  EXAMINATION:   MRI OF THE PELVIS WITHOUT AND WITH CONTRAST, 9/13/2021 7:33 am       TECHNIQUE:   Multiplanar multisequence MRI of the pelvis was performed without and with   the administration of 20 mL ProHance intravenous contrast.       COMPARISON:   CT on 07/01/2021       HISTORY:   Uterine mass. FINDINGS:   Image quality degraded by motion artifact. The uterus measures approximately 10.8 x 5 0 x 4.4 cm. Normal endometrium   and junctional zone. Contiguous with the uterine fundus, there is large   predominantly T2 hypointense mass with scattered areas of T2 hyperintense   signal likely due to degeneration. This measures approximately 17.5 x 16.8 x   12.3 cm and demonstrates heterogeneous but avid enhancement. Normal ovaries containing small follicles. Minimal free fluid is likely   physiologic. Mild disc bulges and desiccation in the lower lumbar spine. Bladder is not   well distended but unremarkable. Visualized gastrointestinal tract is   unremarkable. No lymphadenopathy. Impression   A large mass contiguous with the uterine fundus measuring 17.5 x 16.8 x 12.3   cm is compatible with a pedunculated fibroid. It demonstrates heterogeneous   but avid enhancement and contains areas of degeneration.        Lab Results:  Results for orders placed or performed during the hospital encounter of 07/11/22   GYN Cytology   Result Value Ref Range    Cytology Report INTERPRETATION    Cervical material, (ThinPrep vial, Imaging-assisted review):  Specimen Adequacy:       Satisfactory for evaluation.       - Endocervical/transformation zone component present. Descriptive Diagnosis:       Negative for intraepithelial lesion or malignancy. Cytotechnologist:   ERAN MADSEN(ASCP)  **Electronically Signed Out**  ey/7/15/2022        Procedure/Addendum  HPV Procedure Report       Date Ordered:     7/12/2022     Status: Signed Out       Date Complete:     7/13/2022     By: System Interface       Date Reported:     7/13/2022              Sample:  HPV Type 16      Result:   Not Detected      Ref Range:  (Not Detected)  Sample:  HPV Type 18      Result:   Not Detected      Ref Range: (Not  Detected)  Sample: Other High Risk HPV      Result:   Not Detected      Ref  Range: (Not Detected)  Sample:  HPV Interp      Result:         Ref Range: (Not Detected)  This test amplifies and detects DNA of 14 high-risk HPV types  associated with cervical ca ncer and its precursor lesions   (HPV types 16,18, 31, 33, 35, 39, 45, 51, 52, 56, 58, 59, 66, and  68). Sensitivity may be affected by specimen collection methods, stage of  infection, and the presence of interfering   substances. Results should be interpreted in conjunction with other  available laboratory and clinical data. A negative   high-risk HPV result does not exclude the possibility of future  cytologic HSIL or underlying CIN2-3 or cancer. This test is intended for medical purposes only and is not valid for  the evaluation of suspected sexual abuse or for   other forensic purposes. Source:  A: Cervical material, (ThinPrep vial, Imaging-assisted review)    Clinical History  Perimenopausal  Z01.419 Routine gyn exam without abnormal findings  Co-Test:  ThinPrep Pap with high risk HPV testing    GYNECOLOGIC CYTOLOGY REPORT    Patient Name: Jessie Isidro   Med Rec: [id number]  Path Number: [id number] [place], [address]  [phone]  Fax: [phone]     Human papillomavirus (HPV) DNA probe thin prep high risk   Result Value Ref Range    Specimen Description . CERVIX     HPV Sample . THIN PREP     HPV, Genotype 16 Not Detected Not Detected    HPV, Genotype 18 Not Detected Not Detected    HPV, High Risk Other Not Detected Not Detected    HPV, Interpretation               Assessment:   Diagnosis Orders   1. Encounter for follow-up examination        2. Uterine leiomyoma, unspecified location  Relugolix-Estradiol-Norethind (MYFEMBREE) 40-1-0.5 MG TABS      3. Menorrhagia with regular cycle  Relugolix-Estradiol-Norethind (MYFEMBREE) 40-1-0.5 MG TABS      4. Pelvic pain  Relugolix-Estradiol-Norethind (MYFEMBREE) 40-1-0.5 MG TABS      5. Dysmenorrhea  Relugolix-Estradiol-Norethind (MYFEMBREE) 40-1-0.5 MG TABS        Patient Active Problem List    Diagnosis Date Noted    Leiomyoma of uterus 09/21/2022     MRI on 9/13/2021 which noted a uterus measuring 10.8 x 5.0 x 4.5 cm. Contiguous with the uterine fundus there is a large mass measuring approximately 17.5 x 16.8 x 12.3 cm compatible with a pedunculated fibroid. Normal ovaries containing small follicles. Currently on Myfembree      History of appendectomy 09/21/2022     Ruptured appendix. Appendectomy 2018 [facility]       Bariatric surgery status 09/21/2022     Bariatric surgery in [facility] 6/13/22  Lost 78 pounds to date (9/21/22)         PLAN:  Return if symptoms worsen or fail to improve, for annual.  Failed OCP >60 day trial.  Recommend Myfembree as she trying to avoid surgical management - she has just recovered from bariatric surgery and is doing well  Repeat Annual every 1 year  Cervical Cytology Evaluation begins at 24years old. If Negative Cytology, Follow-up screening per current guidelines.    Return to the office in 12 weeks.  Counseled on preventative health maintenance follow-up. No orders of the defined types were placed in this encounter. The patient, Lou Al is a 46 y.o. female, was seen with a total time spent of 20 minutes for the visit on this date of service by the E/M provider. The time component had both face to face and non face to face time spent in determining the total time component. Counseling and education regarding her diagnosis listed below and her options regarding those diagnoses were also included in determining her time component. Diagnosis Orders   1. Encounter for follow-up examination        2. Uterine leiomyoma, unspecified location  Relugolix-Estradiol-Norethind (MYFEMBREE) 40-1-0.5 MG TABS      3. Menorrhagia with regular cycle  Relugolix-Estradiol-Norethind (MYFEMBREE) 40-1-0.5 MG TABS      4. Pelvic pain  Relugolix-Estradiol-Norethind (MYFEMBREE) 40-1-0.5 MG TABS      5. Dysmenorrhea  Relugolix-Estradiol-Norethind (MYFEMBREE) 40-1-0.5 MG TABS           The patient had her preventative health maintenance recommendations and follow-up reviewed with her at the completion of her visit.

## 2023-02-14 ENCOUNTER — TELEPHONE (OUTPATIENT)
Dept: GYNECOLOGIC ONCOLOGY | Age: 53
End: 2023-02-14

## 2023-04-04 ENCOUNTER — TELEMEDICINE (OUTPATIENT)
Dept: GYNECOLOGIC ONCOLOGY | Age: 53
End: 2023-04-04
Payer: COMMERCIAL

## 2023-04-04 DIAGNOSIS — D21.9 FIBROIDS: Primary | ICD-10-CM

## 2023-04-04 PROCEDURE — 99213 OFFICE O/P EST LOW 20 MIN: CPT | Performed by: OBSTETRICS & GYNECOLOGY

## 2023-04-04 RX ORDER — POTASSIUM CHLORIDE 750 MG/1
TABLET, EXTENDED RELEASE ORAL DAILY
COMMUNITY
Start: 2023-03-27

## 2023-04-04 ASSESSMENT — ENCOUNTER SYMPTOMS
EYES NEGATIVE: 1
GASTROINTESTINAL NEGATIVE: 1
RESPIRATORY NEGATIVE: 1

## 2023-04-04 NOTE — LETTER
April 4, 2023      No referring provider defined for this encounter. Patient: Salina Vargas   MR Number: [id number]   YOB: 1970   Date of Visit: 4/4/2023       Dear Dr. Freeman Bocanegra ref. provider found: Thank you for referring Joseph Nguyen to me for evaluation/treatment. Below are the relevant portions of my assessment and plan of care. If you have questions, please do not hesitate to call me. I look forward to following Ashok Alexandra along with you.     Sincerely,        Leland Clark MD    CC providers:  Keith Dawkins MD  [address]   [address] [address]  Via Fax: [address] [facility], [address] [address]  Via In [employer]

## 2023-04-04 NOTE — PROGRESS NOTES
Gyn Oncology Attending Note    Patient evaluated by me today via telemedicine visit  Chart notes imaging records and history revd by me in detail today  I answered her questions to her satisfaction today    She tells me that she is seeing Dr Alfred Yanes soon for pelvic/vaginal exam and Pap smear  She remains on MyFembree for leiomyoma mgmt  She is highly satisfied with her medical mgmt of her fibroids    She has lost 140 lbs since her bariatrics operation  Her health is good and she tells me that she feels great    No gyn concerns today. Plan:  Brandy Vyas will sign off for now. Dr Alfred Yanes will manage this case moving forward. Patient will return as needed to see me in future.       Regla Martins MD

## 2023-07-12 ENCOUNTER — OFFICE VISIT (OUTPATIENT)
Dept: OBGYN CLINIC | Age: 53
End: 2023-07-12
Payer: COMMERCIAL

## 2023-07-12 ENCOUNTER — HOSPITAL ENCOUNTER (OUTPATIENT)
Age: 53
Setting detail: SPECIMEN
Discharge: HOME | End: 2023-07-12

## 2023-07-12 VITALS
WEIGHT: 205 LBS | DIASTOLIC BLOOD PRESSURE: 72 MMHG | HEIGHT: 71 IN | BODY MASS INDEX: 28.7 KG/M2 | SYSTOLIC BLOOD PRESSURE: 118 MMHG

## 2023-07-12 DIAGNOSIS — N94.6 DYSMENORRHEA: ICD-10-CM

## 2023-07-12 DIAGNOSIS — Z01.419 PAP SMEAR, AS PART OF ROUTINE GYNECOLOGICAL EXAMINATION: Primary | ICD-10-CM

## 2023-07-12 DIAGNOSIS — N92.0 MENORRHAGIA WITH REGULAR CYCLE: ICD-10-CM

## 2023-07-12 DIAGNOSIS — R10.2 PELVIC PAIN: ICD-10-CM

## 2023-07-12 DIAGNOSIS — D25.9 UTERINE LEIOMYOMA, UNSPECIFIED LOCATION: ICD-10-CM

## 2023-07-12 DIAGNOSIS — Z12.31 SCREENING MAMMOGRAM, ENCOUNTER FOR: ICD-10-CM

## 2023-07-12 PROBLEM — E87.6 HYPOKALEMIA: Status: ACTIVE | Noted: 2022-11-09

## 2023-07-12 PROBLEM — D50.8 IRON DEFICIENCY ANEMIA AFTER GASTRECTOMY: Status: ACTIVE | Noted: 2023-03-03

## 2023-07-12 PROBLEM — I10 ESSENTIAL HYPERTENSION: Status: ACTIVE | Noted: 2021-07-30

## 2023-07-12 PROBLEM — E55.9 VITAMIN D DEFICIENCY: Status: ACTIVE | Noted: 2022-11-09

## 2023-07-12 PROBLEM — K91.89 IRON DEFICIENCY ANEMIA AFTER GASTRECTOMY: Status: ACTIVE | Noted: 2023-03-03

## 2023-07-12 PROCEDURE — 3078F DIAST BP <80 MM HG: CPT | Performed by: OBSTETRICS & GYNECOLOGY

## 2023-07-12 PROCEDURE — 3074F SYST BP LT 130 MM HG: CPT | Performed by: OBSTETRICS & GYNECOLOGY

## 2023-07-12 PROCEDURE — 99386 PREV VISIT NEW AGE 40-64: CPT | Performed by: OBSTETRICS & GYNECOLOGY

## 2023-07-12 RX ORDER — RELUGOLIX, ESTRADIOL HEMIHYDRATE, AND NORETHINDRONE ACETATE 40; 1; .5 MG/1; MG/1; MG/1
1 TABLET, FILM COATED ORAL DAILY
Qty: 30 TABLET | Refills: 5 | Status: SHIPPED | OUTPATIENT
Start: 2023-07-12

## 2023-07-12 NOTE — PROGRESS NOTES
History and Physical    William Murrieta  2023              48 y.o. Chief Complaint   Patient presents with    Annual Exam       No LMP recorded. Patient is perimenopausal.             Primary Care Physician: Angel Luis Michel MD    The patient was seen and examined. She has no chief complaint today and is here for her annual exam.  Her bowels are regular. There are no voiding complaints. She denies any bloating. She denies vaginal discharge and was counseled on STD's and the need for barrier contraception. HPI : William Murrieta is a 48 y.o. female     She is feeling well. She is 1 year out from bariatric surgery and is feeling great    She has known large uterine mass, benign fibroid associated with history of heavy bleeding, pelvic pressure/pain. She has seen GYN Oncology for second opinion given size and appearance. Agree with Lupron vs. Myfembree with enlarged fibroid uterus with AUB/HMB and pain. She has failed OCP and then started [facility] and it has been helping tremendously. She states quality of life, symptoms, and mental/emotional health has improved greatly. Counseled on R/B/A of Myfembree including patient satisfaction and success rates as well as side effects    She is not sexually active, denies discharge and declining cultures.       Bowel and bladder are working well without complaint.    ________________________________________________________________________  Radha Root History    Para Term  AB Living   0 0 0 0 0 0   SAB IAB Ectopic Molar Multiple Live Births   0 0 0 0 0 0     Past Medical History:   Diagnosis Date    Essential hypertension     Iron deficiency anemia after gastrectomy 3/3/2023    Menopausal symptoms                                                                    Past Surgical History:   Procedure Laterality Date    APPENDECTOMY  2021    BARIATRIC SURGERY  2022    [address] [facility]  2021

## 2023-07-14 LAB
HPV I/H RISK 4 DNA CVX QL NAA+PROBE: NOT DETECTED
HPV SAMPLE: NORMAL
HPV, INTERPRETATION: NORMAL
HPV16 DNA CVX QL NAA+PROBE: NOT DETECTED
HPV18 DNA CVX QL NAA+PROBE: NOT DETECTED
SPECIMEN DESCRIPTION: NORMAL

## 2023-07-22 LAB — CYTOLOGY REPORT: NORMAL

## 2023-09-05 ENCOUNTER — HOSPITAL ENCOUNTER (OUTPATIENT)
Dept: MAMMOGRAPHY | Age: 53
Discharge: HOME | End: 2023-09-07
Attending: OBSTETRICS & GYNECOLOGY
Payer: COMMERCIAL

## 2023-09-05 VITALS — BODY MASS INDEX: 27.3 KG/M2 | HEIGHT: 71 IN | WEIGHT: 195 LBS

## 2023-09-05 DIAGNOSIS — Z12.31 SCREENING MAMMOGRAM, ENCOUNTER FOR: ICD-10-CM

## 2023-09-05 PROCEDURE — 77063 BREAST TOMOSYNTHESIS BI: CPT

## 2023-09-22 ENCOUNTER — HOSPITAL ENCOUNTER (OUTPATIENT)
Dept: WOMENS IMAGING | Age: 53
End: 2023-09-22
Attending: OBSTETRICS & GYNECOLOGY
Payer: COMMERCIAL

## 2023-09-22 DIAGNOSIS — R92.8 ABNORMAL MAMMOGRAM: ICD-10-CM

## 2023-09-22 PROCEDURE — G0279 TOMOSYNTHESIS, MAMMO: HCPCS

## 2023-09-22 PROCEDURE — 76642 ULTRASOUND BREAST LIMITED: CPT

## 2023-09-27 ENCOUNTER — TELEPHONE (OUTPATIENT)
Dept: OBGYN CLINIC | Age: 53
End: 2023-09-27

## 2023-09-27 DIAGNOSIS — N63.20 MASS OF LEFT BREAST, UNSPECIFIED QUADRANT: Primary | ICD-10-CM

## 2023-09-27 NOTE — TELEPHONE ENCOUNTER
----- Message from Phyllistine Sergio, DO sent at 9/25/2023  7:24 PM EDT -----  Please call and notify patient of concern for left breast lesion and need for biopsy. Please make sure orders placed and patient scheduled for this biopsy. If she would like a breast specialist Dr. Valeria Beck is recommended. Thank you.

## 2023-09-29 DIAGNOSIS — N63.20 MASS OF LEFT BREAST, UNSPECIFIED QUADRANT: Primary | ICD-10-CM

## 2023-10-03 ENCOUNTER — HOSPITAL ENCOUNTER (OUTPATIENT)
Dept: WOMENS IMAGING | Age: 53
Discharge: HOME | End: 2023-10-05
Payer: COMMERCIAL

## 2023-10-03 VITALS — DIASTOLIC BLOOD PRESSURE: 71 MMHG | HEART RATE: 61 BPM | SYSTOLIC BLOOD PRESSURE: 123 MMHG

## 2023-10-03 DIAGNOSIS — N63.20 MASS OF LEFT BREAST, UNSPECIFIED QUADRANT: ICD-10-CM

## 2023-10-03 PROCEDURE — 88341 IMHCHEM/IMCYTCHM EA ADD ANTB: CPT

## 2023-10-03 PROCEDURE — 19083 BX BREAST 1ST LESION US IMAG: CPT

## 2023-10-03 PROCEDURE — 88342 IMHCHEM/IMCYTCHM 1ST ANTB: CPT

## 2023-10-03 PROCEDURE — 88305 TISSUE EXAM BY PATHOLOGIST: CPT

## 2023-10-03 PROCEDURE — 77065 DX MAMMO INCL CAD UNI: CPT

## 2023-10-04 ENCOUNTER — TELEPHONE (OUTPATIENT)
Dept: WOMENS IMAGING | Age: 53
End: 2023-10-04

## 2023-10-04 NOTE — TELEPHONE ENCOUNTER
Contacted patient post biopsy of the left breast.  She is doing well with no concerns.   Verbalized she is to obtain her results from the office of Dr Pascual Kong assistance to her if it is needed in the future. / Electronically signed by ALAN Anton CBIN on 10/4/2023 at 10:07 AM

## 2023-10-06 LAB — SURGICAL PATHOLOGY REPORT: NORMAL

## 2023-10-20 ENCOUNTER — HOSPITAL ENCOUNTER (OUTPATIENT)
Age: 53
Setting detail: SPECIMEN
Discharge: HOME | End: 2023-10-20

## 2023-10-20 ENCOUNTER — PROCEDURE VISIT (OUTPATIENT)
Dept: OBGYN CLINIC | Age: 53
End: 2023-10-20

## 2023-10-20 VITALS — BODY MASS INDEX: 27.34 KG/M2 | SYSTOLIC BLOOD PRESSURE: 122 MMHG | WEIGHT: 196 LBS | DIASTOLIC BLOOD PRESSURE: 78 MMHG

## 2023-10-20 DIAGNOSIS — N84.1 CERVICAL POLYP: Primary | ICD-10-CM

## 2023-10-20 DIAGNOSIS — N88.9 CERVICAL LESION: ICD-10-CM

## 2023-10-20 SDOH — ECONOMIC STABILITY: INCOME INSECURITY: HOW HARD IS IT FOR YOU TO PAY FOR THE VERY BASICS LIKE FOOD, HOUSING, MEDICAL CARE, AND HEATING?: NOT HARD AT ALL

## 2023-10-20 SDOH — ECONOMIC STABILITY: FOOD INSECURITY: WITHIN THE PAST 12 MONTHS, THE FOOD YOU BOUGHT JUST DIDN'T LAST AND YOU DIDN'T HAVE MONEY TO GET MORE.: NEVER TRUE

## 2023-10-20 SDOH — ECONOMIC STABILITY: FOOD INSECURITY: WITHIN THE PAST 12 MONTHS, YOU WORRIED THAT YOUR FOOD WOULD RUN OUT BEFORE YOU GOT MONEY TO BUY MORE.: NEVER TRUE

## 2023-10-20 SDOH — ECONOMIC STABILITY: HOUSING INSECURITY
IN THE LAST 12 MONTHS, WAS THERE A TIME WHEN YOU DID NOT HAVE A STEADY PLACE TO SLEEP OR SLEPT IN A SHELTER (INCLUDING NOW)?: NO

## 2023-10-20 ASSESSMENT — PATIENT HEALTH QUESTIONNAIRE - PHQ9
SUM OF ALL RESPONSES TO PHQ9 QUESTIONS 1 & 2: 0
SUM OF ALL RESPONSES TO PHQ QUESTIONS 1-9: 0
2. FEELING DOWN, DEPRESSED OR HOPELESS: 0
SUM OF ALL RESPONSES TO PHQ QUESTIONS 1-9: 0
SUM OF ALL RESPONSES TO PHQ QUESTIONS 1-9: 0
1. LITTLE INTEREST OR PLEASURE IN DOING THINGS: 0
SUM OF ALL RESPONSES TO PHQ QUESTIONS 1-9: 0

## 2023-10-20 NOTE — PROGRESS NOTES
10/20/2023    Delaney Alberts  Patient's last menstrual period was 08/12/2022 (approximate). Chief Complaint   Patient presents with    Procedure       Blood pressure 122/78, weight 88.9 kg (196 lb), last menstrual period 08/12/2022, not currently breastfeeding. Chaperone for Intimate Exam  Chaperone was offered and accepted as part of the rooming process. Chaperone: Mark MCCLELLAND time out was called by the Chaperone listed above while ALL participants were quiet and attentive. The procedure to be completed was confirmed, with the appropriate laterality demarcated prior, if appropriate, as well as the patients name and a unique identifier, her date of birth. All questions were answered to her satisfaction. The consent was signed prior to the time out and all the risks were discussed in detail. The patient was counseled on the procedure. Risks, benefits and alternatives were reviewed. The possibility of Incomplete removal of the abnormal tissue was reviewed. The patient is aware that this is diagnostic and not curative and a second procedure may be needed. A consent was reviewed and obtained. The patient was positioned comfortably on the exam table. After a bi-manual exam; the uterus was found to be  anteverted with a size of 15 cm. There was no adnexal masses and the bladder was smooth, non-tender and without palpable masses. The speculum was inserted and cervix was visualized. The vaginal was swept of physiologic discharge. The cervix was cleansed with betadine and polyp was visualized. The polyp was grasped with ring forceps and rotated counterclockwise until it was transected. Hemostasis was excellent. Polypectomy biopsy was obtained and sent to pathology. The sites were controlled with silver nitrate for bleeding. The patient tolerated the procedure well. The biopsy sites were hemostatic at the end of the procedure.  Post procedure restrictions were reviewed and given to the

## 2023-10-25 LAB — SURGICAL PATHOLOGY REPORT: NORMAL

## 2024-01-03 DIAGNOSIS — R10.2 PELVIC PAIN: ICD-10-CM

## 2024-01-03 DIAGNOSIS — N92.0 MENORRHAGIA WITH REGULAR CYCLE: ICD-10-CM

## 2024-01-03 DIAGNOSIS — N94.6 DYSMENORRHEA: ICD-10-CM

## 2024-01-03 DIAGNOSIS — D25.9 UTERINE LEIOMYOMA, UNSPECIFIED LOCATION: ICD-10-CM

## 2024-01-06 RX ORDER — RELUGOLIX, ESTRADIOL HEMIHYDRATE, AND NORETHINDRONE ACETATE 40; 1; .5 MG/1; MG/1; MG/1
1 TABLET, FILM COATED ORAL DAILY
Qty: 28 TABLET | Refills: 5 | Status: SHIPPED | OUTPATIENT
Start: 2024-01-06

## 2024-01-30 ENCOUNTER — TELEPHONE (OUTPATIENT)
Dept: OBGYN CLINIC | Age: 54
End: 2024-01-30

## 2024-01-30 NOTE — TELEPHONE ENCOUNTER
Pt stated that Myfembree is denied because she was on it and another hormone. ([facility]) will remove incorrect denial they have. They assigned a  that will overlook her case within 48 hours.      They did not have \"the form\" Norma was looking for.

## 2024-02-01 ENCOUNTER — TELEPHONE (OUTPATIENT)
Dept: OBGYN CLINIC | Age: 54
End: 2024-02-01

## 2024-07-12 ENCOUNTER — HOSPITAL ENCOUNTER (OUTPATIENT)
Age: 54
Setting detail: SPECIMEN
Discharge: HOME | End: 2024-07-12

## 2024-07-12 ENCOUNTER — OFFICE VISIT (OUTPATIENT)
Dept: OBGYN CLINIC | Age: 54
End: 2024-07-12

## 2024-07-12 VITALS
DIASTOLIC BLOOD PRESSURE: 76 MMHG | WEIGHT: 199 LBS | HEIGHT: 71 IN | BODY MASS INDEX: 27.86 KG/M2 | SYSTOLIC BLOOD PRESSURE: 118 MMHG

## 2024-07-12 DIAGNOSIS — Z12.31 SCREENING MAMMOGRAM, ENCOUNTER FOR: ICD-10-CM

## 2024-07-12 DIAGNOSIS — Z12.4 CERVICAL CANCER SCREENING: Primary | ICD-10-CM

## 2024-07-12 DIAGNOSIS — Z91.89 AT RISK FOR OSTEOPENIA: ICD-10-CM

## 2024-07-12 SDOH — ECONOMIC STABILITY: FOOD INSECURITY: WITHIN THE PAST 12 MONTHS, THE FOOD YOU BOUGHT JUST DIDN'T LAST AND YOU DIDN'T HAVE MONEY TO GET MORE.: NEVER TRUE

## 2024-07-12 SDOH — ECONOMIC STABILITY: INCOME INSECURITY: HOW HARD IS IT FOR YOU TO PAY FOR THE VERY BASICS LIKE FOOD, HOUSING, MEDICAL CARE, AND HEATING?: NOT HARD AT ALL

## 2024-07-12 SDOH — ECONOMIC STABILITY: FOOD INSECURITY: WITHIN THE PAST 12 MONTHS, YOU WORRIED THAT YOUR FOOD WOULD RUN OUT BEFORE YOU GOT MONEY TO BUY MORE.: NEVER TRUE

## 2024-07-12 ASSESSMENT — PATIENT HEALTH QUESTIONNAIRE - PHQ9
1. LITTLE INTEREST OR PLEASURE IN DOING THINGS: NOT AT ALL
SUM OF ALL RESPONSES TO PHQ QUESTIONS 1-9: 0
SUM OF ALL RESPONSES TO PHQ9 QUESTIONS 1 & 2: 0
2. FEELING DOWN, DEPRESSED OR HOPELESS: NOT AT ALL

## 2024-07-12 NOTE — PROGRESS NOTES
Sig: TAKE 1 TABLET BY MOUTH DAILY    Route: Oral    Prior authorization: Closed - Prior Authorization duplicate/in process        Social History    Tobacco Use      Smoking status: Never      Smokeless tobacco: Never       Standing Status:   Future     Standing Expiration Date:   7/12/2025     Order Specific Question:   Collection Type     Answer:   Thin Prep     Order Specific Question:   Prior Abnormal Pap Test     Answer:   No     Order Specific Question:   Screening or Diagnostic     Answer:   Screening     Order Specific Question:   HPV Requested?     Answer:   Yes     Order Specific Question:   High Risk Patient     Answer:   N/A           The patient, Samira Calderon is a 54 y.o. female, was seen with a total time spent of 30 minutes for the visit on this date of service by the E/M provider. The time component had both face to face and non face to face time spent in determining the total time component.  Counseling and education regarding her diagnosis listed below and her options regarding those diagnoses were also included in determining her time component.      Diagnosis Orders   1. Cervical cancer screening  PAP Smear      2. Screening mammogram, encounter for  WILLIAM DIGITAL SCREEN W OR WO CAD BILATERAL      3. At risk for osteopenia  DEXA BONE DENSITY 2 SITES           The patient had her preventative health maintenance recommendations and follow-up reviewed with her at the completion of her visit.

## 2024-07-19 LAB — CYTOLOGY REPORT: NORMAL

## 2024-07-24 DIAGNOSIS — N94.6 DYSMENORRHEA: ICD-10-CM

## 2024-07-24 DIAGNOSIS — N92.0 MENORRHAGIA WITH REGULAR CYCLE: ICD-10-CM

## 2024-07-24 DIAGNOSIS — D25.9 UTERINE LEIOMYOMA, UNSPECIFIED LOCATION: ICD-10-CM

## 2024-07-24 DIAGNOSIS — R10.2 PELVIC PAIN: ICD-10-CM

## 2024-07-24 NOTE — TELEPHONE ENCOUNTER
Medication Request/Refill Telephone Documentation:  Medication Requested: myfembree   Provider last filled by: zana  Last visit: 7/12/24  Last annual/pap smear: 7/12/24  NEXT APPT: n/a

## 2024-07-25 RX ORDER — RELUGOLIX, ESTRADIOL HEMIHYDRATE, AND NORETHINDRONE ACETATE 40; 1; .5 MG/1; MG/1; MG/1
1 TABLET, FILM COATED ORAL DAILY
Qty: 28 TABLET | Refills: 5 | Status: SHIPPED | OUTPATIENT
Start: 2024-07-25

## 2024-09-09 ENCOUNTER — HOSPITAL ENCOUNTER (OUTPATIENT)
Dept: MAMMOGRAPHY | Age: 54
Discharge: HOME | End: 2024-09-11
Attending: OBSTETRICS & GYNECOLOGY
Payer: COMMERCIAL

## 2024-09-09 VITALS — HEIGHT: 72 IN | BODY MASS INDEX: 26.41 KG/M2 | WEIGHT: 195 LBS

## 2024-09-09 DIAGNOSIS — Z91.89 AT RISK FOR OSTEOPENIA: ICD-10-CM

## 2024-09-09 DIAGNOSIS — Z12.31 SCREENING MAMMOGRAM, ENCOUNTER FOR: ICD-10-CM

## 2024-09-09 PROCEDURE — 77080 DXA BONE DENSITY AXIAL: CPT

## 2024-09-09 PROCEDURE — 77063 BREAST TOMOSYNTHESIS BI: CPT

## 2024-12-31 ENCOUNTER — PATIENT MESSAGE (OUTPATIENT)
Dept: OBGYN CLINIC | Age: 54
End: 2024-12-31

## 2024-12-31 DIAGNOSIS — N92.0 MENORRHAGIA WITH REGULAR CYCLE: ICD-10-CM

## 2024-12-31 DIAGNOSIS — R10.2 PELVIC PAIN: ICD-10-CM

## 2024-12-31 DIAGNOSIS — D25.9 UTERINE LEIOMYOMA, UNSPECIFIED LOCATION: ICD-10-CM

## 2024-12-31 DIAGNOSIS — N94.6 DYSMENORRHEA: ICD-10-CM

## 2024-12-31 RX ORDER — RELUGOLIX, ESTRADIOL HEMIHYDRATE, AND NORETHINDRONE ACETATE 40; 1; .5 MG/1; MG/1; MG/1
1 TABLET, FILM COATED ORAL DAILY
Qty: 28 TABLET | Refills: 5 | Status: SHIPPED | OUTPATIENT
Start: 2024-12-31

## 2024-12-31 NOTE — TELEPHONE ENCOUNTER
Pt switching insurance and will need potentially new rx so we can submit a new prior authorization under that insurance.

## 2025-01-02 ENCOUNTER — TELEPHONE (OUTPATIENT)
Dept: OBGYN CLINIC | Age: 55
End: 2025-01-02

## 2025-01-02 NOTE — TELEPHONE ENCOUNTER
Yes these medications do not work the same way and will not help her with her bleeding or pain from fibroids.  Myfembree is relugolix which is different than just giving her HRT.      Do we need to do a peer to peer?  The other thing to consider is that at age 54 she may be perimenopausal or postmenopausal.  If this is the case she should not need the myfembree as bleeding and pain from cycling will be resolved naturally.  Thank you.

## 2025-01-02 NOTE — TELEPHONE ENCOUNTER
Pt called as she spoke with her insurance and myfembree is exclusion from the plan and not covered at all- they told her estradiol, prempro or prempra?- I told pt that usually used for menopauual sx but will double check with dr spann    At this time pt is wondering what other alternative can she use to help tx her sx like she using the myfembree for.

## 2025-01-07 ENCOUNTER — TELEPHONE (OUTPATIENT)
Age: 55
End: 2025-01-07

## 2025-01-07 NOTE — TELEPHONE ENCOUNTER
Wrote Crystal IShart message    \"Annual check up. Looking for the first appt of the day between 8 and 830am. July 13, 2025 - July 31, 2025      LVM for pt to call office to schedule.

## 2025-07-25 ENCOUNTER — HOSPITAL ENCOUNTER (OUTPATIENT)
Age: 55
Setting detail: SPECIMEN
Discharge: HOME | End: 2025-07-25

## 2025-07-25 ENCOUNTER — OFFICE VISIT (OUTPATIENT)
Dept: OBGYN CLINIC | Age: 55
End: 2025-07-25
Payer: COMMERCIAL

## 2025-07-25 VITALS
HEIGHT: 71 IN | WEIGHT: 236 LBS | SYSTOLIC BLOOD PRESSURE: 122 MMHG | BODY MASS INDEX: 33.04 KG/M2 | DIASTOLIC BLOOD PRESSURE: 70 MMHG

## 2025-07-25 DIAGNOSIS — N95.1 VASOMOTOR SYMPTOMS DUE TO MENOPAUSE: ICD-10-CM

## 2025-07-25 DIAGNOSIS — Z12.31 SCREENING MAMMOGRAM, ENCOUNTER FOR: ICD-10-CM

## 2025-07-25 DIAGNOSIS — Z01.419 PAP SMEAR, AS PART OF ROUTINE GYNECOLOGICAL EXAMINATION: Primary | ICD-10-CM

## 2025-07-25 PROCEDURE — 3078F DIAST BP <80 MM HG: CPT | Performed by: OBSTETRICS & GYNECOLOGY

## 2025-07-25 PROCEDURE — 99396 PREV VISIT EST AGE 40-64: CPT | Performed by: OBSTETRICS & GYNECOLOGY

## 2025-07-25 PROCEDURE — 3074F SYST BP LT 130 MM HG: CPT | Performed by: OBSTETRICS & GYNECOLOGY

## 2025-07-25 SDOH — ECONOMIC STABILITY: FOOD INSECURITY: WITHIN THE PAST 12 MONTHS, YOU WORRIED THAT YOUR FOOD WOULD RUN OUT BEFORE YOU GOT MONEY TO BUY MORE.: NEVER TRUE

## 2025-07-25 SDOH — ECONOMIC STABILITY: FOOD INSECURITY: WITHIN THE PAST 12 MONTHS, THE FOOD YOU BOUGHT JUST DIDN'T LAST AND YOU DIDN'T HAVE MONEY TO GET MORE.: NEVER TRUE

## 2025-07-25 ASSESSMENT — PATIENT HEALTH QUESTIONNAIRE - PHQ9
SUM OF ALL RESPONSES TO PHQ QUESTIONS 1-9: 0
1. LITTLE INTEREST OR PLEASURE IN DOING THINGS: NOT AT ALL
SUM OF ALL RESPONSES TO PHQ QUESTIONS 1-9: 0
2. FEELING DOWN, DEPRESSED OR HOPELESS: NOT AT ALL

## 2025-07-25 NOTE — PROGRESS NOTES
History and Physical    Samira Calderon  2025              55 y.o.  Chief Complaint   Patient presents with    Annual Exam       Patient's last menstrual period was 2022 (approximate).             Primary Care Physician: Rupal Travis MD    The patient was seen and examined. She has no chief complaint today and is here for her annual exam.  Her bowels are regular. There are no voiding complaints. She denies any bloating.  She denies vaginal discharge and was counseled on STD's and the need for barrier contraception.     HPI : Samira Calderon is a 55 y.o. female     She is feeling well.  She has had bariatric surgery and is feeling well    She has known large uterine mass, benign fibroid associated with history of heavy bleeding, pelvic pressure/pain.  She has seen GYN Oncology for second opinion given size and appearance.  Agree with Lupron vs. Myfembree with enlarged fibroid uterus with AUB/HMB and pain.  She has failed OCP and then started myfembree and it has been helping tremendously.  She states quality of life, symptoms, and mental/emotional health has improved greatly.  She states she has had a 110% positive response to myfembree and pain and bleeding are controlled.  She is approaching 2 years of treatment and recommend continuation of medication with dexa scan.  She has discontinued Myfembree due to insurance coverage despite normal bone mineral density and effectiveness.  She is feeling well at this time.     She is not sexually active, denies discharge and declining cultures.      Bowel and bladder are working well without complaint.    She is having some hot flashes, insomnia.  She is inquiring about possible HRT.  Discussed there are risk and benefits and patient will monitor and return to discuss in more detail.    ________________________________________________________________________  OB History    Para Term  AB Living   0 0 0 0 0 0   SAB IAB Ectopic

## 2025-08-09 ENCOUNTER — RESULTS FOLLOW-UP (OUTPATIENT)
Dept: OBGYN CLINIC | Age: 55
End: 2025-08-09

## 2025-08-09 LAB — CYTOLOGY REPORT: NORMAL

## 2025-08-15 ENCOUNTER — TELEPHONE (OUTPATIENT)
Dept: OBGYN CLINIC | Age: 55
End: 2025-08-15

## (undated) DEVICE — INSUFFLATION NEEDLE TO ESTABLISH PNEUMOPERITONEUM.: Brand: INSUFFLATION NEEDLE

## (undated) DEVICE — SYRINGE MED 10ML SLIP TIP BLNT FILL AND LUERLOCK DISP

## (undated) DEVICE — VESSEL SEALER EXTEND: Brand: ENDOWRIST

## (undated) DEVICE — ARM DRAPE

## (undated) DEVICE — PENCIL ES L3M BTTN SWCH HOLSTER W/ BLDE ELECTRD EDGE

## (undated) DEVICE — BLANKET WRM W29.9XL79.1IN UP BODY FORC AIR MISTRAL-AIR

## (undated) DEVICE — SUTURE MCRYL + SZ 4 0 L18IN ABSRB UD PC 3 L16MM 3 8 CIR PRIM MCP845G

## (undated) DEVICE — GLOVE ORANGE PI 7   MSG9070

## (undated) DEVICE — ELECTRODE PT RET AD L9FT HI MOIST COND ADH HYDRGEL CORDED

## (undated) DEVICE — SHIELD SOAK PRE-KLENZ 6ML

## (undated) DEVICE — STAPLER 60 RELOAD BLUE: Brand: SUREFORM

## (undated) DEVICE — ADHESIVE SKIN CLSR 0.7ML TOP DERMBND ADV

## (undated) DEVICE — TOTAL TRAY, 16FR 10ML SIL FOLEY, URN: Brand: MEDLINE

## (undated) DEVICE — SYRINGE 20ML LL S/C 50

## (undated) DEVICE — PLUMEPORT LAPAROSCOPIC SMOKE FILTRATION DEVICE: Brand: PLUMEPORT ACTIV

## (undated) DEVICE — DRAIN SURG 19FR 100% SIL RADPQ RND CHN FULL FLUT

## (undated) DEVICE — GOWN,AURORA,NONRNF,XL,30/CS: Brand: MEDLINE

## (undated) DEVICE — CONTAINER,SPECIMEN,4OZ,OR STRL: Brand: MEDLINE

## (undated) DEVICE — SPONGE DRN W4XL4IN RAYON/POLYESTER 6 PLY NONWOVEN PRECUT

## (undated) DEVICE — NEEDLE SPNL L3.5IN PNK HUB S STL REG WALL FIT STYL W/ QNCKE

## (undated) DEVICE — MHPB BASIC LAP PACK: Brand: MEDLINE INDUSTRIES, INC.

## (undated) DEVICE — STRAP,POSITIONING,KNEE/BODY,FOAM,4X60": Brand: MEDLINE

## (undated) DEVICE — SOLUTION ANTIFOG VIS SYS CLEARIFY LAPSCP

## (undated) DEVICE — SEAL

## (undated) DEVICE — 4-PORT MANIFOLD: Brand: NEPTUNE 2

## (undated) DEVICE — GLOVE SURG SZ 75 CRM LTX FREE POLYISOPRENE POLYMER BEAD ANTI

## (undated) DEVICE — SOLUTION IV IRRIG POUR BRL 0.9% SODIUM CHL 2F7124

## (undated) DEVICE — PROTECTOR ULN NRV PUR FOAM HK LOOP STRP ANATOMICALLY

## (undated) DEVICE — STAPLER 60: Brand: SUREFORM

## (undated) DEVICE — BAG SPEC REM 224ML W4XL6IN DIA10MM 1 HND GYN DISP ENDOPCH

## (undated) DEVICE — GLOVE SURG SZ 8 CRM LTX FREE POLYISOPRENE POLYMER BEAD ANTI

## (undated) DEVICE — CHLORAPREP 26ML ORANGE

## (undated) DEVICE — SUCTION IRRIGATOR: Brand: ENDOWRIST

## (undated) DEVICE — SUTURE SZ 0 27IN 5/8 CIR UR-6  TAPER PT VIOLET ABSRB VICRYL J603H

## (undated) DEVICE — SOLUTION IV 1000ML 0.9% SOD CHL PH 5 INJ USP VIAFLX PLAS

## (undated) DEVICE — BLADELESS OBTURATOR: Brand: WECK VISTA

## (undated) DEVICE — SUTURE ETHLN SZ 3-0 L18IN NONABSORBABLE BLK FS-1 L24MM 3/8 663H

## (undated) DEVICE — RESERVOIR,SUCTION,100CC,SILICONE: Brand: MEDLINE

## (undated) DEVICE — DEVICE TRCR 12X9X3IN WHT CLSR DISP OMNICLOSE

## (undated) DEVICE — 40580 - THE PINK PAD - ADVANCED TRENDELENBURG POSITIONING KIT: Brand: 40580 - THE PINK PAD - ADVANCED TRENDELENBURG POSITIONING KIT

## (undated) DEVICE — CANNULA SEAL